# Patient Record
Sex: MALE | Race: OTHER
[De-identification: names, ages, dates, MRNs, and addresses within clinical notes are randomized per-mention and may not be internally consistent; named-entity substitution may affect disease eponyms.]

---

## 2020-09-24 ENCOUNTER — HOSPITAL ENCOUNTER (INPATIENT)
Dept: HOSPITAL 72 - EMR | Age: 60
LOS: 10 days | Discharge: NURSING, REHAB OR LONG-TERM CARE | DRG: 720 | End: 2020-10-04
Payer: COMMERCIAL

## 2020-09-24 VITALS — DIASTOLIC BLOOD PRESSURE: 42 MMHG | SYSTOLIC BLOOD PRESSURE: 78 MMHG

## 2020-09-24 VITALS — SYSTOLIC BLOOD PRESSURE: 92 MMHG | DIASTOLIC BLOOD PRESSURE: 43 MMHG

## 2020-09-24 VITALS — SYSTOLIC BLOOD PRESSURE: 108 MMHG | DIASTOLIC BLOOD PRESSURE: 39 MMHG

## 2020-09-24 VITALS — SYSTOLIC BLOOD PRESSURE: 84 MMHG | DIASTOLIC BLOOD PRESSURE: 47 MMHG

## 2020-09-24 VITALS — SYSTOLIC BLOOD PRESSURE: 100 MMHG | DIASTOLIC BLOOD PRESSURE: 47 MMHG

## 2020-09-24 VITALS — DIASTOLIC BLOOD PRESSURE: 40 MMHG | SYSTOLIC BLOOD PRESSURE: 119 MMHG

## 2020-09-24 VITALS — DIASTOLIC BLOOD PRESSURE: 46 MMHG | SYSTOLIC BLOOD PRESSURE: 92 MMHG

## 2020-09-24 VITALS — DIASTOLIC BLOOD PRESSURE: 36 MMHG | SYSTOLIC BLOOD PRESSURE: 90 MMHG

## 2020-09-24 VITALS — SYSTOLIC BLOOD PRESSURE: 101 MMHG | DIASTOLIC BLOOD PRESSURE: 45 MMHG

## 2020-09-24 VITALS — SYSTOLIC BLOOD PRESSURE: 119 MMHG | DIASTOLIC BLOOD PRESSURE: 60 MMHG

## 2020-09-24 VITALS — SYSTOLIC BLOOD PRESSURE: 91 MMHG | DIASTOLIC BLOOD PRESSURE: 27 MMHG

## 2020-09-24 VITALS — SYSTOLIC BLOOD PRESSURE: 104 MMHG | DIASTOLIC BLOOD PRESSURE: 37 MMHG

## 2020-09-24 VITALS — BODY MASS INDEX: 25.75 KG/M2 | HEIGHT: 70 IN | WEIGHT: 179.9 LBS

## 2020-09-24 DIAGNOSIS — E87.2: ICD-10-CM

## 2020-09-24 DIAGNOSIS — D64.9: ICD-10-CM

## 2020-09-24 DIAGNOSIS — J96.10: ICD-10-CM
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
## 2020-09-24 DIAGNOSIS — B96.20: ICD-10-CM

## 2020-09-24 DIAGNOSIS — Z16.21: ICD-10-CM

## 2020-09-24 DIAGNOSIS — E11.9: ICD-10-CM

## 2020-09-24 DIAGNOSIS — R40.2353: ICD-10-CM

## 2020-09-24 DIAGNOSIS — R40.2143: ICD-10-CM

## 2020-09-24 DIAGNOSIS — I48.0: ICD-10-CM

## 2020-09-24 DIAGNOSIS — Z93.1: ICD-10-CM

## 2020-09-24 DIAGNOSIS — N39.0: ICD-10-CM

## 2020-09-24 DIAGNOSIS — Z98.2: ICD-10-CM

## 2020-09-24 DIAGNOSIS — Z93.0: ICD-10-CM

## 2020-09-24 DIAGNOSIS — R40.3: ICD-10-CM

## 2020-09-24 DIAGNOSIS — I11.0: ICD-10-CM

## 2020-09-24 DIAGNOSIS — R65.21: ICD-10-CM
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
## 2020-09-24 DIAGNOSIS — G93.49: ICD-10-CM

## 2020-09-24 DIAGNOSIS — E87.6: ICD-10-CM

## 2020-09-24 DIAGNOSIS — T81.31XA: ICD-10-CM

## 2020-09-24 DIAGNOSIS — A41.53: Primary | ICD-10-CM

## 2020-09-24 DIAGNOSIS — G40.909: ICD-10-CM

## 2020-09-24 DIAGNOSIS — J90: ICD-10-CM

## 2020-09-24 DIAGNOSIS — Z99.11: ICD-10-CM
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
## 2020-09-24 DIAGNOSIS — I50.9: ICD-10-CM

## 2020-09-24 DIAGNOSIS — Z98.890: ICD-10-CM

## 2020-09-24 DIAGNOSIS — R19.7: ICD-10-CM

## 2020-09-24 DIAGNOSIS — J18.9: ICD-10-CM

## 2020-09-24 DIAGNOSIS — E46: ICD-10-CM

## 2020-09-24 DIAGNOSIS — I69.298: ICD-10-CM

## 2020-09-24 DIAGNOSIS — E83.42: ICD-10-CM

## 2020-09-24 DIAGNOSIS — R47.02: ICD-10-CM

## 2020-09-24 DIAGNOSIS — R40.2213: ICD-10-CM

## 2020-09-24 LAB
ADD MANUAL DIFF: YES
ALBUMIN SERPL-MCNC: 1.6 G/DL (ref 3.4–5)
ALBUMIN/GLOB SERPL: 0.4 {RATIO} (ref 1–2.7)
ALP SERPL-CCNC: 188 U/L (ref 46–116)
ALT SERPL-CCNC: 28 U/L (ref 12–78)
ANION GAP SERPL CALC-SCNC: 10 MMOL/L (ref 5–15)
APPEARANCE UR: (no result)
APTT BLD: 30 SEC (ref 23–33)
APTT PPP: YELLOW S
AST SERPL-CCNC: 45 U/L (ref 15–37)
BILIRUB SERPL-MCNC: 0.7 MG/DL (ref 0.2–1)
BUN SERPL-MCNC: 12 MG/DL (ref 7–18)
CALCIUM SERPL-MCNC: 8.1 MG/DL (ref 8.5–10.1)
CHLORIDE SERPL-SCNC: 96 MMOL/L (ref 98–107)
CK MB SERPL-MCNC: 0.8 NG/ML (ref 0–3.6)
CK SERPL-CCNC: 56 U/L (ref 26–308)
CO2 SERPL-SCNC: 29 MMOL/L (ref 21–32)
CREAT SERPL-MCNC: 0.7 MG/DL (ref 0.55–1.3)
ERYTHROCYTE [DISTWIDTH] IN BLOOD BY AUTOMATED COUNT: 16.9 % (ref 11.6–14.8)
FERRITIN SERPL-MCNC: 352 NG/ML (ref 8–388)
GLOBULIN SER-MCNC: 4.3 G/DL
GLUCOSE UR STRIP-MCNC: NEGATIVE MG/DL
HCT VFR BLD CALC: 24.4 % (ref 42–52)
HGB BLD-MCNC: 7.9 G/DL (ref 14.2–18)
INR PPP: 1.1 (ref 0.9–1.1)
KETONES UR QL STRIP: NEGATIVE
LDH SERPL L TO P-CCNC: 182 U/L (ref 81–234)
LEUKOCYTE ESTERASE UR QL STRIP: (no result)
MCV RBC AUTO: 83 FL (ref 80–99)
NITRITE UR QL STRIP: POSITIVE
PH UR STRIP: 6 [PH] (ref 4.5–8)
PHOSPHATE SERPL-MCNC: 3 MG/DL (ref 2.5–4.9)
PLATELET # BLD: 272 K/UL (ref 150–450)
POTASSIUM SERPL-SCNC: 2.7 MMOL/L (ref 3.5–5.1)
PROT UR QL STRIP: (no result)
RBC # BLD AUTO: 2.94 M/UL (ref 4.7–6.1)
SODIUM SERPL-SCNC: 136 MMOL/L (ref 136–145)
SP GR UR STRIP: 1 (ref 1–1.03)
UROBILINOGEN UR-MCNC: 1 MG/DL (ref 0–1)
WBC # BLD AUTO: 7.7 K/UL (ref 4.8–10.8)

## 2020-09-24 PROCEDURE — 82550 ASSAY OF CK (CPK): CPT

## 2020-09-24 PROCEDURE — 82803 BLOOD GASES ANY COMBINATION: CPT

## 2020-09-24 PROCEDURE — 84443 ASSAY THYROID STIM HORMONE: CPT

## 2020-09-24 PROCEDURE — 87181 SC STD AGAR DILUTION PER AGT: CPT

## 2020-09-24 PROCEDURE — 86920 COMPATIBILITY TEST SPIN: CPT

## 2020-09-24 PROCEDURE — 71045 X-RAY EXAM CHEST 1 VIEW: CPT

## 2020-09-24 PROCEDURE — 84100 ASSAY OF PHOSPHORUS: CPT

## 2020-09-24 PROCEDURE — 87086 URINE CULTURE/COLONY COUNT: CPT

## 2020-09-24 PROCEDURE — 93005 ELECTROCARDIOGRAM TRACING: CPT

## 2020-09-24 PROCEDURE — 85379 FIBRIN DEGRADATION QUANT: CPT

## 2020-09-24 PROCEDURE — 99291 CRITICAL CARE FIRST HOUR: CPT

## 2020-09-24 PROCEDURE — 94002 VENT MGMT INPAT INIT DAY: CPT

## 2020-09-24 PROCEDURE — 76937 US GUIDE VASCULAR ACCESS: CPT

## 2020-09-24 PROCEDURE — 36569 INSJ PICC 5 YR+ W/O IMAGING: CPT

## 2020-09-24 PROCEDURE — 94003 VENT MGMT INPAT SUBQ DAY: CPT

## 2020-09-24 PROCEDURE — 85007 BL SMEAR W/DIFF WBC COUNT: CPT

## 2020-09-24 PROCEDURE — 36415 COLL VENOUS BLD VENIPUNCTURE: CPT

## 2020-09-24 PROCEDURE — 80053 COMPREHEN METABOLIC PANEL: CPT

## 2020-09-24 PROCEDURE — 83690 ASSAY OF LIPASE: CPT

## 2020-09-24 PROCEDURE — 76700 US EXAM ABDOM COMPLETE: CPT

## 2020-09-24 PROCEDURE — 86140 C-REACTIVE PROTEIN: CPT

## 2020-09-24 PROCEDURE — 78266 GSTR EMPT IMG SM BWL&COLON: CPT

## 2020-09-24 PROCEDURE — 83540 ASSAY OF IRON: CPT

## 2020-09-24 PROCEDURE — 87081 CULTURE SCREEN ONLY: CPT

## 2020-09-24 PROCEDURE — 85730 THROMBOPLASTIN TIME PARTIAL: CPT

## 2020-09-24 PROCEDURE — 83550 IRON BINDING TEST: CPT

## 2020-09-24 PROCEDURE — 87040 BLOOD CULTURE FOR BACTERIA: CPT

## 2020-09-24 PROCEDURE — 86900 BLOOD TYPING SEROLOGIC ABO: CPT

## 2020-09-24 PROCEDURE — 82728 ASSAY OF FERRITIN: CPT

## 2020-09-24 PROCEDURE — 82270 OCCULT BLOOD FECES: CPT

## 2020-09-24 PROCEDURE — 80048 BASIC METABOLIC PNL TOTAL CA: CPT

## 2020-09-24 PROCEDURE — 83735 ASSAY OF MAGNESIUM: CPT

## 2020-09-24 PROCEDURE — 81003 URINALYSIS AUTO W/O SCOPE: CPT

## 2020-09-24 PROCEDURE — 87324 CLOSTRIDIUM AG IA: CPT

## 2020-09-24 PROCEDURE — 96368 THER/DIAG CONCURRENT INF: CPT

## 2020-09-24 PROCEDURE — 96367 TX/PROPH/DG ADDL SEQ IV INF: CPT

## 2020-09-24 PROCEDURE — 84484 ASSAY OF TROPONIN QUANT: CPT

## 2020-09-24 PROCEDURE — 83880 ASSAY OF NATRIURETIC PEPTIDE: CPT

## 2020-09-24 PROCEDURE — 85610 PROTHROMBIN TIME: CPT

## 2020-09-24 PROCEDURE — 96365 THER/PROPH/DIAG IV INF INIT: CPT

## 2020-09-24 PROCEDURE — 86850 RBC ANTIBODY SCREEN: CPT

## 2020-09-24 PROCEDURE — 85025 COMPLETE CBC W/AUTO DIFF WBC: CPT

## 2020-09-24 PROCEDURE — 83615 LACTATE (LD) (LDH) ENZYME: CPT

## 2020-09-24 PROCEDURE — 86901 BLOOD TYPING SEROLOGIC RH(D): CPT

## 2020-09-24 PROCEDURE — 82553 CREATINE MB FRACTION: CPT

## 2020-09-24 PROCEDURE — 83605 ASSAY OF LACTIC ACID: CPT

## 2020-09-24 RX ADMIN — CARVEDILOL SCH MG: 25 TABLET, FILM COATED ORAL at 21:00

## 2020-09-24 RX ADMIN — DEXTROSE MONOHYDRATE SCH MLS/HR: 50 INJECTION, SOLUTION INTRAVENOUS at 21:38

## 2020-09-24 RX ADMIN — SODIUM CHLORIDE SCH MLS/HR: 900 INJECTION, SOLUTION INTRAVENOUS at 21:37

## 2020-09-24 RX ADMIN — HEPARIN SODIUM SCH UNITS: 5000 INJECTION INTRAVENOUS; SUBCUTANEOUS at 21:39

## 2020-09-24 RX ADMIN — ACETAMINOPHEN PRN MG: 160 SOLUTION ORAL at 21:39

## 2020-09-24 RX ADMIN — ZINC SULFATE CAP 220 MG (50 MG ELEMENTAL ZN) SCH MG: 220 (50 ZN) CAP at 21:39

## 2020-09-24 RX ADMIN — LEVETIRACETAM SCH MG: 100 SOLUTION ORAL at 21:38

## 2020-09-24 NOTE — EMERGENCY ROOM REPORT
History of Present Illness


General


Chief Complaint:  Dyspnea/Respdistress


Source:  EMS





Present Illness


HPI


Disclaimer: Please note that this report is being documented using DRAGON 

technology. This can lead to erroneous entry secondary to incorrect 

interpretation by the dictating instrument.





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 

bleed, trach and vent dependent, G-tube feed dependent presents for evaluation 

of hypotension and respiratory distress.  From his nursing facility he was found

increasingly tachypneic with low blood pressure systolics in the 90s earlier 

today.  Felt warm but no objective temperature readings were taken.  No further 

information provided.


 


PMH: Reviewed


 


PSH: Trach, G-tube


 


Allergies: None listed in chart


 


Social Hx: Unable to obtain from patient


Allergies:  


Coded Allergies:  


     No Known Allergies (Unverified , 9/24/20)





COVID-19 Screening


Contact w/high risk pt:  No


Experienced COVID-19 symptoms?:  Yes


COVID-19 Testing performed PTA:  No





Nursing Documentation-PMH


Past Medical History:  No History, Except For


Hx COPD:  Yes - ventilator dependant


Hx Diabetes:  Yes


Hx Gastrointestinal Problems:  Yes - dysphasia, GT


Hx Seizures:  Yes





Review of Systems


All Other Systems:  limited - Unable to obtain from patient due to clinical cond

ition





Physical Exam





Vital Signs








  Date Time  Temp Pulse Resp B/P (MAP) Pulse Ox O2 Delivery O2 Flow Rate FiO2


 


9/24/20 16:13  120 30 96/62 (73) 93 Mechanical Ventilator  





 





General: GCS 3, tachycardic, tachypneic


HEENT: NC/AT. EOMI. tracheostomy appears in place without evidence of trauma or 

bleeding.  No subcutaneous air in the neck or face.


Cardiovascular: Tachycardic


Resp: Tachypnea increased work of breathing.  Rhonchorous.  No wheezing.


Abdomen: Abdomen is soft, nondistended.  Nontender


Skin: Warm to the touch.  Intact.  


MSK: No spontaneous movement.  Bilateral picklines present.


Neuro: GCS 3.





Procedures


Critical Care Time


Critical Care Time


Total critical care time: Approximately 45 minutes





Due to a high probability of clinically significant, life threatening 

deterioration, the patient required the highest level of preparedness to 

intervene emergently and I personally spent this critical care time directly and

personally managing the patient. This critical care time included obtaining a 

history, examining the patient, pulse oximetry, ordering and reviewing studies, 

ordering treatments, evaluating response to treatment and updating management 

plan as needed, frequent reassessment and discussion with other providers as 

well as arranging for ultimate disposition.  This critical to care time was 

performed to assess and manage the high probability of life-threatening 

deterioration that could result in multiorgan failure.  This critical care time 

is separate from the separately billable procedures and treating other patients.





Medical Decision Making


Diagnostic Impression:  


   Primary Impression:  


   Rapid atrial fibrillation


   Additional Impressions:  


   Sepsis


   Pneumonia


ER Course


60-year-old male history of intracranial bleed with persistent vegetative state 

who is now trach and vent dependent presents for evaluation of hypotension and 

tachypnea.  Concern for sepsis secondary to a pulmonary source, occult 

bacteremia, cardiac event, COVID-19 among others.  Patient arrived hypotensive 

and started 30 cc/kg bolus and levophed.  EKG shows rapid atrial fibrillation 

with rate in the 20s.  Because of his low blood pressure held off on beta 

blocking medications at this time.  Started on broad-spectrum antibiotics.  

Chest x-ray concerning for left lower lobe pneumonia.  Initial lactate elevated.

 Repeat after fluids.  Patient will be admitted to the ICU. Dr. Dewey is the 

admitting physician per patient's health plan





Sepsis reevaluation:


I, Dr. Arash Campa, reevaluated the patient


Capillary refill: Less than 2 seconds


MAP: 64


Heart rate: 120


Respiratory rate: 27


Initial Lactate: 3.3


Repeat Lactate: 3.3


Pressors: Levophed drip


No signs of fluid overload





Laboratory Tests








Test


 9/24/20


16:15 9/24/20


16:30 9/24/20


16:52 9/24/20


17:15


 


White Blood Count


 7.7 K/UL


(4.8-10.8) 


 


 





 


Red Blood Count


 2.94 M/UL


(4.70-6.10)  L 


 


 





 


Hemoglobin


 7.9 G/DL


(14.2-18.0)  L 


 


 





 


Hematocrit


 24.4 %


(42.0-52.0)  L 


 


 





 


Mean Corpuscular Volume 83 FL (80-99)     


 


Mean Corpuscular Hemoglobin


 26.9 PG


(27.0-31.0)  L 


 


 





 


Mean Corpuscular Hemoglobin


Concent 32.4 G/DL


(32.0-36.0) 


 


 





 


Red Cell Distribution Width


 16.9 %


(11.6-14.8)  H 


 


 





 


Platelet Count


 272 K/UL


(150-450) 


 


 





 


Mean Platelet Volume


 7.5 FL


(6.5-10.1) 


 


 





 


Neutrophils (%) (Auto)


 % (45.0-75.0)


 


 


 





 


Lymphocytes (%) (Auto)


 % (20.0-45.0)


 


 


 





 


Monocytes (%) (Auto)  % (1.0-10.0)     


 


Eosinophils (%) (Auto)  % (0.0-3.0)     


 


Basophils (%) (Auto)  % (0.0-2.0)     


 


Differential Total Cells


Counted 100  


 


 


 





 


Neutrophils % (Manual) 79 % (45-75)  H   


 


Lymphocytes % (Manual) 4 % (20-45)  L   


 


Monocytes % (Manual) 3 % (1-10)     


 


Eosinophils % (Manual) 1 % (0-3)     


 


Basophils % (Manual) 0 % (0-2)     


 


Band Neutrophils 13 % (0-8)  H   


 


Nucleated Red Blood Cells 1 /100 WBC     


 


Platelet Estimate Adequate     


 


Platelet Morphology Normal     


 


Anisocytosis 1+     


 


Prothrombin Time


 12.0 SEC


(9.30-11.50)  H 


 


 





 


Prothrombin Time INR 1.1 (0.9-1.1)     


 


Activated Partial


Thromboplast Time 30 SEC (23-33)


 


 


 





 


D-Dimer


 21.51 mg/L FEU


(0.00-0.49)  H 


 


 





 


Sodium Level


 136 MMOL/L


(136-145) 


 


 





 


Potassium Level


 2.7 MMOL/L


(3.5-5.1)  *L 


 


 





 


Chloride Level


 96 MMOL/L


()  L 


 


 





 


Carbon Dioxide Level


 29 MMOL/L


(21-32) 


 


 





 


Anion Gap


 10 mmol/L


(5-15) 


 


 





 


Blood Urea Nitrogen


 12 mg/dL


(7-18) 


 


 





 


Creatinine


 0.7 MG/DL


(0.55-1.30) 


 


 





 


Estimated Glomerular


Filtration Rate > 60 mL/min


(>60) 


 


 





 


Glucose Level


 99 MG/DL


() 


 


 





 


Lactic Acid Level


 3.30 mmol/L


(0.4-2.0)  H 


 


 3.30 mmol/L


(0.66-2.22)  H


 


Calcium Level


 8.1 MG/DL


(8.5-10.1)  L 


 


 





 


Phosphorus Level


 3.0 MG/DL


(2.5-4.9) 


 


 





 


Magnesium Level


 1.2 MG/DL


(1.8-2.4)  L 


 


 





 


Ferritin


 352 NG/ML


(8-388) 


 


 





 


Total Bilirubin


 0.7 MG/DL


(0.2-1.0) 


 


 





 


Aspartate Amino Transferase


(AST) 45 U/L (15-37)


H 


 


 





 


Alanine Aminotransferase (ALT)


 28 U/L (12-78)


 


 


 





 


Alkaline Phosphatase


 188 U/L


()  H 


 


 





 


Lactate Dehydrogenase


 182 U/L


() 


 


 





 


Total Creatine Kinase


 56 U/L


() 


 


 





 


Creatine Kinase MB


 0.8 NG/ML


(0.0-3.6) 


 


 





 


Creatine Kinase MB Relative


Index 1.4  


 


 


 





 


Troponin I


 0.000 ng/mL


(0.000-0.056) 


 


 





 


C-Reactive Protein,


Quantitative 45.3 mg/dL


(0.00-0.90)  H 


 


 





 


Pro-B-Type Natriuretic Peptide


 242 pg/mL


(0-125)  H 


 


 





 


Total Protein


 5.9 G/DL


(6.4-8.2)  L 


 


 





 


Albumin


 1.6 G/DL


(3.4-5.0)  L 


 


 





 


Globulin 4.3 g/dL     


 


Albumin/Globulin Ratio


 0.4 (1.0-2.7)


L 


 


 





 


Lipase


 1131 U/L


()  H 


 


 





 


Urine Color  Yellow    


 


Urine Appearance  Cloudy    


 


Urine pH  6 (4.5-8.0)    


 


Urine Specific Gravity


 


 1.005


(1.005-1.035) 


 





 


Urine Protein


 


 3+ (NEGATIVE)


H 


 





 


Urine Glucose (UA)


 


 Negative


(NEGATIVE) 


 





 


Urine Ketones


 


 Negative


(NEGATIVE) 


 





 


Urine Blood


 


 4+ (NEGATIVE)


H 


 





 


Urine Nitrite


 


 Positive


(NEGATIVE)  H 


 





 


Urine Bilirubin


 


 Negative


(NEGATIVE) 


 





 


Urine Urobilinogen


 


 1 MG/DL


(0.0-1.0)  H 


 





 


Urine Leukocyte Esterase


 


 1+ (NEGATIVE)


H 


 





 


Urine RBC


 


 30-40 /HPF (0


- 0)  H 


 





 


Urine WBC


 


 10-15 /HPF (0


- 0)  H 


 





 


Urine Squamous Epithelial


Cells 


 Occasional


/LPF 


 





 


Urine Bacteria


 


 Many /HPF


(NONE)  H 


 





 


Arterial Blood pH


 


 


 7.507


(7.350-7.450) 





 


Arterial Blood Partial


Pressure CO2 


 


 35.2 mmHg


(35.0-45.0) 





 


Arterial Blood Partial


Pressure O2 


 


 126.8 mmHg


(75.0-100.0)  H 





 


Arterial Blood HCO3


 


 


 27.3 mmol/L


(22.0-26.0)  H 





 


Arterial Blood Oxygen


Saturation 


 


 98.2 %


() 





 


Arterial Blood Base Excess   4.2 (-2-2)  H 


 


Giovanny Test   Positive   








Microbiology








 Date/Time


Source Procedure


Growth Status





 


 9/24/20 16:15


Nasopharynx SARS-CoV-2 RdRp Gene Assay - Final Complete








EKG Diagnostic Results


EKG Time:  16:12


Rate:  tachycardiac


Other Impression


Atrial fibrillation with rapid ventricular rate in the 120s





Rhythm Strip Diag. Results


Rhythm Strip Time:  16:12


EP Interpretation:  yes


Rate:  120s


Rhythm:  other - Rapid atrial fibrillation





Chest X-Ray Diagnostic Results


Chest X-Ray Diagnostic Results :  


   Chest X-Ray Ordered:  Yes


   Indication:  Shortness of Breath


   EP Interpretation:  Yes


   Interpretation:  other - Tracheostomy in place, left lower lobe consolidation


   Impression:  Other - Left lower lobe pneumonia


   Electronically Signed by:  Electronically signed by Dr. Arash Campa





Last Vital Signs








  Date Time  Temp Pulse Resp B/P (MAP) Pulse Ox O2 Delivery O2 Flow Rate FiO2


 


9/24/20 16:13  120 30 96/62 (73) 93 Mechanical Ventilator  








Disposition:  ADMITTED AS INPATIENT


Condition:  Serious











Arash Campa MD              Sep 24, 2020 16:26

## 2020-09-25 VITALS — SYSTOLIC BLOOD PRESSURE: 134 MMHG | DIASTOLIC BLOOD PRESSURE: 42 MMHG

## 2020-09-25 VITALS — SYSTOLIC BLOOD PRESSURE: 99 MMHG | DIASTOLIC BLOOD PRESSURE: 31 MMHG

## 2020-09-25 VITALS — SYSTOLIC BLOOD PRESSURE: 111 MMHG | DIASTOLIC BLOOD PRESSURE: 30 MMHG

## 2020-09-25 VITALS — DIASTOLIC BLOOD PRESSURE: 24 MMHG | SYSTOLIC BLOOD PRESSURE: 100 MMHG

## 2020-09-25 VITALS — SYSTOLIC BLOOD PRESSURE: 110 MMHG | DIASTOLIC BLOOD PRESSURE: 35 MMHG

## 2020-09-25 VITALS — DIASTOLIC BLOOD PRESSURE: 34 MMHG | SYSTOLIC BLOOD PRESSURE: 116 MMHG

## 2020-09-25 VITALS — DIASTOLIC BLOOD PRESSURE: 35 MMHG | SYSTOLIC BLOOD PRESSURE: 112 MMHG

## 2020-09-25 VITALS — SYSTOLIC BLOOD PRESSURE: 99 MMHG | DIASTOLIC BLOOD PRESSURE: 27 MMHG

## 2020-09-25 VITALS — SYSTOLIC BLOOD PRESSURE: 102 MMHG | DIASTOLIC BLOOD PRESSURE: 41 MMHG

## 2020-09-25 VITALS — SYSTOLIC BLOOD PRESSURE: 102 MMHG | DIASTOLIC BLOOD PRESSURE: 31 MMHG

## 2020-09-25 VITALS — SYSTOLIC BLOOD PRESSURE: 122 MMHG | DIASTOLIC BLOOD PRESSURE: 40 MMHG

## 2020-09-25 VITALS — SYSTOLIC BLOOD PRESSURE: 114 MMHG | DIASTOLIC BLOOD PRESSURE: 24 MMHG

## 2020-09-25 VITALS — SYSTOLIC BLOOD PRESSURE: 104 MMHG | DIASTOLIC BLOOD PRESSURE: 34 MMHG

## 2020-09-25 VITALS — SYSTOLIC BLOOD PRESSURE: 101 MMHG | DIASTOLIC BLOOD PRESSURE: 25 MMHG

## 2020-09-25 VITALS — SYSTOLIC BLOOD PRESSURE: 121 MMHG | DIASTOLIC BLOOD PRESSURE: 28 MMHG

## 2020-09-25 VITALS — DIASTOLIC BLOOD PRESSURE: 42 MMHG | SYSTOLIC BLOOD PRESSURE: 151 MMHG

## 2020-09-25 VITALS — DIASTOLIC BLOOD PRESSURE: 41 MMHG | SYSTOLIC BLOOD PRESSURE: 105 MMHG

## 2020-09-25 VITALS — SYSTOLIC BLOOD PRESSURE: 137 MMHG | DIASTOLIC BLOOD PRESSURE: 47 MMHG

## 2020-09-25 VITALS — DIASTOLIC BLOOD PRESSURE: 19 MMHG | SYSTOLIC BLOOD PRESSURE: 96 MMHG

## 2020-09-25 VITALS — DIASTOLIC BLOOD PRESSURE: 38 MMHG | SYSTOLIC BLOOD PRESSURE: 103 MMHG

## 2020-09-25 VITALS — SYSTOLIC BLOOD PRESSURE: 110 MMHG | DIASTOLIC BLOOD PRESSURE: 42 MMHG

## 2020-09-25 VITALS — SYSTOLIC BLOOD PRESSURE: 114 MMHG | DIASTOLIC BLOOD PRESSURE: 56 MMHG

## 2020-09-25 VITALS — DIASTOLIC BLOOD PRESSURE: 25 MMHG | SYSTOLIC BLOOD PRESSURE: 106 MMHG

## 2020-09-25 VITALS — DIASTOLIC BLOOD PRESSURE: 97 MMHG | SYSTOLIC BLOOD PRESSURE: 124 MMHG

## 2020-09-25 VITALS — DIASTOLIC BLOOD PRESSURE: 21 MMHG | SYSTOLIC BLOOD PRESSURE: 127 MMHG

## 2020-09-25 VITALS — DIASTOLIC BLOOD PRESSURE: 37 MMHG | SYSTOLIC BLOOD PRESSURE: 106 MMHG

## 2020-09-25 VITALS — SYSTOLIC BLOOD PRESSURE: 120 MMHG | DIASTOLIC BLOOD PRESSURE: 26 MMHG

## 2020-09-25 VITALS — SYSTOLIC BLOOD PRESSURE: 100 MMHG | DIASTOLIC BLOOD PRESSURE: 34 MMHG

## 2020-09-25 VITALS — DIASTOLIC BLOOD PRESSURE: 21 MMHG | SYSTOLIC BLOOD PRESSURE: 97 MMHG

## 2020-09-25 VITALS — SYSTOLIC BLOOD PRESSURE: 112 MMHG | DIASTOLIC BLOOD PRESSURE: 31 MMHG

## 2020-09-25 VITALS — SYSTOLIC BLOOD PRESSURE: 130 MMHG | DIASTOLIC BLOOD PRESSURE: 42 MMHG

## 2020-09-25 VITALS — SYSTOLIC BLOOD PRESSURE: 97 MMHG | DIASTOLIC BLOOD PRESSURE: 32 MMHG

## 2020-09-25 VITALS — DIASTOLIC BLOOD PRESSURE: 42 MMHG | SYSTOLIC BLOOD PRESSURE: 84 MMHG

## 2020-09-25 VITALS — DIASTOLIC BLOOD PRESSURE: 52 MMHG | SYSTOLIC BLOOD PRESSURE: 131 MMHG

## 2020-09-25 VITALS — DIASTOLIC BLOOD PRESSURE: 32 MMHG | SYSTOLIC BLOOD PRESSURE: 104 MMHG

## 2020-09-25 VITALS — DIASTOLIC BLOOD PRESSURE: 29 MMHG | SYSTOLIC BLOOD PRESSURE: 114 MMHG

## 2020-09-25 VITALS — DIASTOLIC BLOOD PRESSURE: 33 MMHG | SYSTOLIC BLOOD PRESSURE: 145 MMHG

## 2020-09-25 VITALS — SYSTOLIC BLOOD PRESSURE: 118 MMHG | DIASTOLIC BLOOD PRESSURE: 28 MMHG

## 2020-09-25 VITALS — SYSTOLIC BLOOD PRESSURE: 116 MMHG | DIASTOLIC BLOOD PRESSURE: 39 MMHG

## 2020-09-25 VITALS — SYSTOLIC BLOOD PRESSURE: 109 MMHG | DIASTOLIC BLOOD PRESSURE: 27 MMHG

## 2020-09-25 VITALS — SYSTOLIC BLOOD PRESSURE: 117 MMHG | DIASTOLIC BLOOD PRESSURE: 24 MMHG

## 2020-09-25 VITALS — SYSTOLIC BLOOD PRESSURE: 103 MMHG | DIASTOLIC BLOOD PRESSURE: 34 MMHG

## 2020-09-25 VITALS — DIASTOLIC BLOOD PRESSURE: 43 MMHG | SYSTOLIC BLOOD PRESSURE: 106 MMHG

## 2020-09-25 VITALS — DIASTOLIC BLOOD PRESSURE: 37 MMHG | SYSTOLIC BLOOD PRESSURE: 113 MMHG

## 2020-09-25 VITALS — DIASTOLIC BLOOD PRESSURE: 57 MMHG | SYSTOLIC BLOOD PRESSURE: 98 MMHG

## 2020-09-25 VITALS — SYSTOLIC BLOOD PRESSURE: 127 MMHG | DIASTOLIC BLOOD PRESSURE: 36 MMHG

## 2020-09-25 VITALS — SYSTOLIC BLOOD PRESSURE: 104 MMHG | DIASTOLIC BLOOD PRESSURE: 28 MMHG

## 2020-09-25 VITALS — SYSTOLIC BLOOD PRESSURE: 130 MMHG | DIASTOLIC BLOOD PRESSURE: 35 MMHG

## 2020-09-25 VITALS — SYSTOLIC BLOOD PRESSURE: 114 MMHG | DIASTOLIC BLOOD PRESSURE: 30 MMHG

## 2020-09-25 VITALS — DIASTOLIC BLOOD PRESSURE: 40 MMHG | SYSTOLIC BLOOD PRESSURE: 102 MMHG

## 2020-09-25 VITALS — DIASTOLIC BLOOD PRESSURE: 34 MMHG | SYSTOLIC BLOOD PRESSURE: 130 MMHG

## 2020-09-25 VITALS — DIASTOLIC BLOOD PRESSURE: 42 MMHG | SYSTOLIC BLOOD PRESSURE: 98 MMHG

## 2020-09-25 VITALS — SYSTOLIC BLOOD PRESSURE: 105 MMHG | DIASTOLIC BLOOD PRESSURE: 41 MMHG

## 2020-09-25 VITALS — DIASTOLIC BLOOD PRESSURE: 39 MMHG | SYSTOLIC BLOOD PRESSURE: 134 MMHG

## 2020-09-25 VITALS — DIASTOLIC BLOOD PRESSURE: 55 MMHG | SYSTOLIC BLOOD PRESSURE: 119 MMHG

## 2020-09-25 VITALS — DIASTOLIC BLOOD PRESSURE: 25 MMHG | SYSTOLIC BLOOD PRESSURE: 94 MMHG

## 2020-09-25 VITALS — DIASTOLIC BLOOD PRESSURE: 33 MMHG | SYSTOLIC BLOOD PRESSURE: 113 MMHG

## 2020-09-25 VITALS — DIASTOLIC BLOOD PRESSURE: 42 MMHG | SYSTOLIC BLOOD PRESSURE: 93 MMHG

## 2020-09-25 VITALS — SYSTOLIC BLOOD PRESSURE: 125 MMHG | DIASTOLIC BLOOD PRESSURE: 31 MMHG

## 2020-09-25 VITALS — DIASTOLIC BLOOD PRESSURE: 53 MMHG | SYSTOLIC BLOOD PRESSURE: 124 MMHG

## 2020-09-25 VITALS — SYSTOLIC BLOOD PRESSURE: 117 MMHG | DIASTOLIC BLOOD PRESSURE: 34 MMHG

## 2020-09-25 VITALS — DIASTOLIC BLOOD PRESSURE: 36 MMHG | SYSTOLIC BLOOD PRESSURE: 86 MMHG

## 2020-09-25 VITALS — DIASTOLIC BLOOD PRESSURE: 34 MMHG | SYSTOLIC BLOOD PRESSURE: 123 MMHG

## 2020-09-25 VITALS — DIASTOLIC BLOOD PRESSURE: 40 MMHG | SYSTOLIC BLOOD PRESSURE: 142 MMHG

## 2020-09-25 VITALS — DIASTOLIC BLOOD PRESSURE: 32 MMHG | SYSTOLIC BLOOD PRESSURE: 118 MMHG

## 2020-09-25 VITALS — SYSTOLIC BLOOD PRESSURE: 115 MMHG | DIASTOLIC BLOOD PRESSURE: 31 MMHG

## 2020-09-25 VITALS — DIASTOLIC BLOOD PRESSURE: 45 MMHG | SYSTOLIC BLOOD PRESSURE: 106 MMHG

## 2020-09-25 VITALS — SYSTOLIC BLOOD PRESSURE: 98 MMHG | DIASTOLIC BLOOD PRESSURE: 30 MMHG

## 2020-09-25 VITALS — SYSTOLIC BLOOD PRESSURE: 126 MMHG | DIASTOLIC BLOOD PRESSURE: 34 MMHG

## 2020-09-25 VITALS — DIASTOLIC BLOOD PRESSURE: 46 MMHG | SYSTOLIC BLOOD PRESSURE: 96 MMHG

## 2020-09-25 LAB
ADD MANUAL DIFF: YES
ALBUMIN SERPL-MCNC: 1.5 G/DL (ref 3.4–5)
ALBUMIN/GLOB SERPL: 0.3 {RATIO} (ref 1–2.7)
ALP SERPL-CCNC: 144 U/L (ref 46–116)
ALT SERPL-CCNC: 26 U/L (ref 12–78)
ANION GAP SERPL CALC-SCNC: 8 MMOL/L (ref 5–15)
AST SERPL-CCNC: 45 U/L (ref 15–37)
BILIRUB SERPL-MCNC: 0.8 MG/DL (ref 0.2–1)
BUN SERPL-MCNC: 12 MG/DL (ref 7–18)
CALCIUM SERPL-MCNC: 7.6 MG/DL (ref 8.5–10.1)
CHLORIDE SERPL-SCNC: 97 MMOL/L (ref 98–107)
CO2 SERPL-SCNC: 26 MMOL/L (ref 21–32)
CREAT SERPL-MCNC: 0.6 MG/DL (ref 0.55–1.3)
ERYTHROCYTE [DISTWIDTH] IN BLOOD BY AUTOMATED COUNT: 16.2 % (ref 11.6–14.8)
GLOBULIN SER-MCNC: 4.3 G/DL
HCT VFR BLD CALC: 21.5 % (ref 42–52)
HGB BLD-MCNC: 7.3 G/DL (ref 14.2–18)
MCV RBC AUTO: 80 FL (ref 80–99)
PLATELET # BLD: 246 K/UL (ref 150–450)
POTASSIUM SERPL-SCNC: 3.3 MMOL/L (ref 3.5–5.1)
RBC # BLD AUTO: 2.7 M/UL (ref 4.7–6.1)
SODIUM SERPL-SCNC: 131 MMOL/L (ref 136–145)
WBC # BLD AUTO: 17.8 K/UL (ref 4.8–10.8)

## 2020-09-25 RX ADMIN — CARVEDILOL SCH MG: 25 TABLET, FILM COATED ORAL at 09:43

## 2020-09-25 RX ADMIN — SODIUM CHLORIDE SCH MLS/HR: 900 INJECTION, SOLUTION INTRAVENOUS at 12:38

## 2020-09-25 RX ADMIN — LEVETIRACETAM SCH MG: 100 SOLUTION ORAL at 21:03

## 2020-09-25 RX ADMIN — MEROPENEM SCH MLS/HR: 500 INJECTION INTRAVENOUS at 21:04

## 2020-09-25 RX ADMIN — MEROPENEM SCH MLS/HR: 500 INJECTION INTRAVENOUS at 14:37

## 2020-09-25 RX ADMIN — Medication SCH MG: at 17:13

## 2020-09-25 RX ADMIN — CARVEDILOL SCH MG: 25 TABLET, FILM COATED ORAL at 21:00

## 2020-09-25 RX ADMIN — SODIUM CHLORIDE SCH MLS/HR: 900 INJECTION, SOLUTION INTRAVENOUS at 02:14

## 2020-09-25 RX ADMIN — ZINC SULFATE CAP 220 MG (50 MG ELEMENTAL ZN) SCH MG: 220 (50 ZN) CAP at 09:43

## 2020-09-25 RX ADMIN — LEVETIRACETAM SCH MG: 100 SOLUTION ORAL at 09:43

## 2020-09-25 RX ADMIN — Medication SCH MG: at 09:42

## 2020-09-25 RX ADMIN — HEPARIN SODIUM SCH UNITS: 5000 INJECTION INTRAVENOUS; SUBCUTANEOUS at 09:00

## 2020-09-25 RX ADMIN — ACETAMINOPHEN PRN MG: 160 SOLUTION ORAL at 04:31

## 2020-09-25 RX ADMIN — ACETAMINOPHEN PRN MG: 160 SOLUTION ORAL at 17:13

## 2020-09-25 RX ADMIN — DEXTROSE MONOHYDRATE SCH MLS/HR: 50 INJECTION, SOLUTION INTRAVENOUS at 06:43

## 2020-09-25 RX ADMIN — CHLORHEXIDINE GLUCONATE SCH APPLIC: 213 SOLUTION TOPICAL at 20:15

## 2020-09-25 RX ADMIN — HEPARIN SODIUM SCH UNITS: 5000 INJECTION INTRAVENOUS; SUBCUTANEOUS at 21:00

## 2020-09-25 RX ADMIN — SODIUM CHLORIDE SCH MLS/HR: 900 INJECTION, SOLUTION INTRAVENOUS at 19:29

## 2020-09-25 RX ADMIN — SODIUM CHLORIDE SCH MLS/HR: 900 INJECTION, SOLUTION INTRAVENOUS at 06:44

## 2020-09-25 NOTE — DIAGNOSTIC IMAGING REPORT
Indication: Abnormal liver function tests, anemia

 

Technique: Gray-scale and duplex images of the upper abdomen were obtained

 

Comparison: none

 

Findings: Gallbladder demonstrates sludge and gallstones. Gallbladder wall is

thickened and edematous, measuring up to 6 mm thick. For gallbladder is not

distended, however. Some comet tail artifact is seen in the gallbladder wall as well.

 Sonographic Dominguez sign could not be reported, patient uncommunicative.  Common bile

duct measures 2 mm in diameter.  No intrahepatic biliary ductal dilatation.  Liver

demonstrates normal echogenicity, no focal abnormality. It is somewhat enlarged.

Portal vein and hepatic veins are patent.   The pancreas is unremarkable. However,

adjacent to the pancreatic body, there is a 2.6 cm somewhat hypoechoic mass.   

Spleen is unremarkable.  Left kidney measures 11.9 cm in length.  Right kidney

measures 12.4 cm length.  Both kidneys demonstrate normal echogenicity.  There is no

hydronephrosis.   No focal abnormality . Abdominal aorta is partially obscured by

bowel gas, visualized portions are non-aneurysmal .  There is a small right pleural

effusion

 

Impression: Hepatomegaly

 

Gallbladder sludge and stones. Gallbladder wall thickening and edema, raises

possibility of acute cholecystitis. This could also be edema due to hemodynamic

causes or adjacent hepatic inflammation, however. Consider hepatobiliary nuclear scan

for further evaluation

 

Comet tail artifact within the gallbladder wall, consistent with adenomyomatosis

 

2.6 cm mass adjacent to the pancreatic body. Could represent lymphadenopathy.

Consider further evaluation with CT scan

 

Small right pleural effusion

## 2020-09-25 NOTE — BRIEF OPERATIVE NOTE
Immediate Post Operative Note


Operative Note


Pre-op Diagnosis:


needs long term IV access


Procedure:


PICC


Post-op Diagnosis:  same as pre-op


Surgeon:  IVA Winter


Anesthesia:  local


Specimen:  none


Complications:  none


Fluids:  none


Implant(s) used?:  No











See Winter MD                Sep 25, 2020 19:02

## 2020-09-25 NOTE — DIAGNOSTIC IMAGING REPORT
Procedure: XRAY Chest 1v

Reason for study: Reason For Exam: SOB

 

Comparison films:  None.

 

FINDINGS:

 

There is a tracheostomy in place. Right-sided  shunt also noted. Vascularity is

normal. There are patchy bilateral infiltrates. Mild cardiomegaly and bilateral small

effusions noted. The bony thorax appear unremarkable.

 

IMPRESSION:  

 

Bilateral patchy infiltrates with bilateral small effusions.

## 2020-09-25 NOTE — HISTORY AND PHYSICAL REPORT
DATE OF ADMISSION:  09/24/2020

CHIEF COMPLAINT:  Septic shock.



HISTORY OF PRESENT ILLNESS:  The patient is an unfortunate 60-year-old

male.  He apparently suffered an intracranial bleed several weeks ago.  He

underwent a craniotomy and EVD, and later a  shunt.  His hospital course

was complicated by seizures and chronic respiratory failure.  He underwent

a placement of a trach and a surgical G-tube.  He was transferred to a

skilled nursing facility where he developed fevers and shortness of

breath, was transferred to the emergency room.  On evaluation there, he

was tachypneic, febrile, and hypotensive.  He received IV fluids, but

eventually required placement on pressors for blood pressure support.  The

patient has been being pancultured and started on broad spectrum IV

antibiotics, now admitted for further evaluation and care.



PAST MEDICAL HISTORY:  As above.



PAST SURGICAL HISTORY:  As above.



CURRENT MEDICATIONS:  Reconciled and reviewed.



ALLERGIES:  None.



FAMILY HISTORY:  None.



SOCIAL HISTORY:  There is no known history of tobacco or drugs.  The

patient has a history of alcohol use.



REVIEW OF SYSTEMS:  Unobtainable as the patient is nonverbal.



PHYSICAL EXAMINATION:

VITAL SIGNS:  Temperature 100.3, pulse 107, respirations 22, blood pressure

114/24.

GENERAL:  The patient is a thin, chronically ill-appearing male, in no

apparent distress.  The pupils are equal, round, and reactive to light.

Oropharynx is clear.

NECK:  Supple.  Trach site was clean and midline.

HEART:  Regular rate and rhythm.

LUNGS:  Clear.

ABDOMEN:  Soft, nontender, nondistended.  There is surgical scar on the

right side of the abdomen with staples.

EXTREMITIES:  Without clubbing or cyanosis.  There is 1+ edema

noted.



LABORATORY DATA:  UA showed 10 to 15 wbc's.  White count was 8, hemoglobin

8, platelet count 272,000.  Sodium 136, potassium 2.7, chloride 96, bicarb

of 29, BUN of 12, creatinine 0.7.  C-reactive protein was 45.  Lipase was

1100.



Chest x-ray showed a possible left lower lobe infiltrate.



ASSESSMENT:  This is an unfortunate 60-year-old male with a history of

intracranial bleed, status post craniotomy,  shunt.  He has a history of

seizures and chronic respiratory failure.



PLAN:

1. We will discontinue the patient's old PICC line.

2. Broad spectrum IV antibiotics.

3. Continue pressors.

4. Aggressive fluid resuscitation.

5. Continue vent support.

6. ID and Pulmonary consultations will be obtained.

7. Plan of care has been discussed with the patient's sisters.  They

currently want him a Full Code.  They are aware that his status is

currently critical and guarded.









  ______________________________________________

  Brijesh Dewey M.D.





DR:  AQUILES

D:  09/25/2020 08:08

T:  09/25/2020 08:39

JOB#:  [id number]

CC:

## 2020-09-25 NOTE — DIAGNOSTIC IMAGING REPORT
Indications: Needs long-term IV access

 

Technique: Procedure performed at bedside. Procedural timeout performed. Ultrasound

confirms patent compressible right basilic vein. Total sterile technique, including

sterile probe cover and sterile gel, sterile gloves, hand hygiene, hat, mask,,

sterile gown, large sterile drape, and preparation with 2% chlorhexidine utilized.

Local anesthesia with 1% lidocaine. Under real-time ultrasound guidance, puncture

basilic vein using 21-gauge needle, passage 0.018 guidewire, exchange for 4 Icelandic

peel-away sheath. 4 Icelandic Bard dual-lumen power PICC cut to 45 cm. It was inserted

through the peel-away sheath. Peel-away sheath and guidewire removed. Catheter fixed

to the skin. Both catheter ports aspirated and flushed. Patient tolerated procedure

well, without immediate complication. Followup chest x-ray obtained, documents

catheter tip position at the cavoatrial junction

 

Impression: Successful bedside placement of right arm PICC under sonographic

guidance, as described above.

## 2020-09-25 NOTE — CARDIOLOGY PROGRESS NOTE
Subjective


DATE OF SERVICE:  Sep 25, 1930


Patient remains in ICU


On pressor support for hypotension.


Low hemoglobin, K+, and Mg++ noted today.





Objective





Last 24 Hour Vital Signs








  Date Time  Temp Pulse Resp B/P (MAP) Pulse Ox O2 Delivery O2 Flow Rate FiO2


 


9/25/20 23:08  106 27     40


 


9/25/20 22:45  100 23 118/32 (60) 100   


 


9/25/20 22:30  99 24 112/35 (60) 100   


 


9/25/20 22:15  99 20 114/29 (57) 100   


 


9/25/20 22:00    113/32    


 


9/25/20 22:00  96 21 113/33 (59) 100   


 


9/25/20 21:45 98.7 96 19 106/37 (60) 100   


 


9/25/20 21:30 98.8 95 21 110/42 (64) 100   


 


9/25/20 21:15  95 21 106/37 (60) 100   


 


9/25/20 21:00  92 22 102/41 (61) 100   


 


9/25/20 21:00    102/41    


 


9/25/20 20:45  92 21 103/34 (57) 100   


 


9/25/20 20:30  93 24 102/40 (60) 100   


 


9/25/20 20:15  91 21 98/30 (52) 100   


 


9/25/20 20:00 98.8 96 22 98/42 (60) 100   


 


9/25/20 20:00      Mechanical Ventilator  


 


9/25/20 20:00    98/42    


 


9/25/20 20:00        40


 


9/25/20 20:00  95      


 


9/25/20 19:30  97 24 96/46 (63) 100   


 


9/25/20 19:29    90/41    


 


9/25/20 19:15  93 20 86/36 (53) 100   


 


9/25/20 19:03  99 25     40


 


9/25/20 19:00    105/41    


 


9/25/20 19:00  96 20 105/41 (62) 100   


 


9/25/20 18:48 99.5       


 


9/25/20 18:45  111 29 119/55 (76) 100   


 


9/25/20 18:30  105 27 105/41 (62) 100   


 


9/25/20 18:15  106 24 106/43 (64) 100   


 


9/25/20 18:00    98/57    


 


9/25/20 18:00  107 24 98/57 (71) 100   


 


9/25/20 17:45  109 23 93/42 (59) 100   


 


9/25/20 17:30  116 27 103/38 (59) 100   


 


9/25/20 17:15  110 21 99/31 (53) 100   


 


9/25/20 17:00    102/31    


 


9/25/20 17:00  110 23 102/31 (54) 100   


 


9/25/20 16:45  107 26 99/27 (51) 100   


 


9/25/20 16:30  109 24 97/32 (53) 100   


 


9/25/20 16:15 102.1 113 23 110/35 (60) 100   


 


9/25/20 16:00  107      


 


9/25/20 16:00  107 25 104/34 (57) 100   


 


9/25/20 16:00    104/34    


 


9/25/20 16:00      Mechanical Ventilator  


 


9/25/20 15:57        40


 


9/25/20 15:45  106 23 104/28 (53) 100   


 


9/25/20 15:30  106 25 112/31 (58) 100   


 


9/25/20 15:15  108 19 104/32 (56) 100   


 


9/25/20 15:10  105 24     40


 


9/25/20 15:00  104 21 97/21 (46) 100   


 


9/25/20 15:00    97/21    


 


9/25/20 14:45  104 24 94/25 (48) 100   


 


9/25/20 14:30  105 24 96/19 (44) 100   


 


9/25/20 14:15  108 25 100/24 (49) 100   


 


9/25/20 14:00  110 23 109/27 (54) 100   


 


9/25/20 14:00    109/27    


 


9/25/20 13:45  112 23 116/34 (61) 100   


 


9/25/20 13:30  113 28 118/28 (58) 100   


 


9/25/20 13:15  115 27 117/24 (55) 100   


 


9/25/20 13:00    113/37    


 


9/25/20 13:00  118 27 113/37 (62) 100   


 


9/25/20 12:45  120 37 127/21 (56) 100   


 


9/25/20 12:38    106/25    


 


9/25/20 12:30  120 32 106/25 (52) 100   


 


9/25/20 12:15  117 31 126/34 (64) 100   


 


9/25/20 12:00  119      


 


9/25/20 12:00 99.2 117 35 137/47 (77) 100   


 


9/25/20 12:00        60


 


9/25/20 12:00      Mechanical Ventilator  


 


9/25/20 12:00    137/47    


 


9/25/20 11:45  120 35 116/39 (64) 100   


 


9/25/20 11:30  114 29 134/42 (72)    


 


9/25/20 11:15  110 29 123/34 (63) 100   


 


9/25/20 11:05  117 32     60


 


9/25/20 11:00    130/35    


 


9/25/20 11:00  109 24 130/35 (66) 100   


 


9/25/20 10:45  113 28 124/53 (76) 100   


 


9/25/20 10:30  112 36 134/39 (70) 100   


 


9/25/20 10:15  126 38 151/42 (78) 100   


 


9/25/20 10:00    114/56    


 


9/25/20 10:00  118 36 114/56 (75) 100   


 


9/25/20 09:45  108 36 142/40 (74) 100   


 


9/25/20 09:43  101  130/34    


 


9/25/20 09:30  100 30 130/34 (66) 100   


 


9/25/20 09:15  99 23 124/97 (106) 100   


 


9/25/20 09:00    122/40    


 


9/25/20 09:00  92 23 122/40 (67) 100   


 


9/25/20 08:45  95 24 127/36 (66) 100   


 


9/25/20 08:30    130/42    


 


9/25/20 08:30  87 21 130/42 (71) 100   


 


9/25/20 08:00        60


 


9/25/20 08:00 98.9 88 21 114/30 (58) 100   


 


9/25/20 08:00      Mechanical Ventilator  


 


9/25/20 08:00    114/30    


 


9/25/20 08:00  87      


 


9/25/20 07:30  88 21 120/26 (57) 100   


 


9/25/20 07:10  94 21     60


 


9/25/20 07:00    114/24    


 


9/25/20 07:00  90 22 114/24 (54) 100   


 


9/25/20 06:44    111/36    


 


9/25/20 06:00  107 48 131/52 (78) 100   


 


9/25/20 06:00    131/52    


 


9/25/20 05:01 100.3       


 


9/25/20 05:00  100 24 111/30 (57) 100   


 


9/25/20 05:00    112/35    


 


9/25/20 04:00    115/31    


 


9/25/20 04:00        80


 


9/25/20 04:00 100.5 102 34 115/31 (59) 100   


 


9/25/20 04:00      Mechanical Ventilator  


 


9/25/20 03:31  100      


 


9/25/20 03:22  105 20     80


 


9/25/20 03:00    106/45    


 


9/25/20 03:00  97 20 106/45 (65) 100   


 


9/25/20 02:14    100/34    


 


9/25/20 02:00  97 20 100/34 (56) 100   


 


9/25/20 01:00  101 20 101/25 (50) 100   


 


9/25/20 00:00      Mechanical Ventilator  


 


9/25/20 00:00    103/31    


 


9/25/20 00:00 100.3 112 25 84/42 (56) 100   








ROS: unchanged from my note of 9/24/20.


HEENT:  Thin Trach secretions


RHYTHM:  Afib


LUNGS:  bilateral rhonchi


CARDIAC:  irregularly irregular


ABDOMEN:  non tender, soft, distended, G-Tube intact


EXTREMITIES:  trace edema, other - Neuro: comatose state





Laboratory Tests








Test


 9/25/20


05:00 9/25/20


10:00


 


Lactic Acid Level


 1.80 mmol/L


(0.4-2.0) 





 


White Blood Count


 


 17.8 K/UL


(4.8-10.8)  #H


 


Red Blood Count


 


 2.70 M/UL


(4.70-6.10)  L


 


Hemoglobin


 


 7.3 G/DL


(14.2-18.0)  L


 


Hematocrit


 


 21.5 %


(42.0-52.0)  L


 


Mean Corpuscular Volume  80 FL (80-99)  


 


Mean Corpuscular Hemoglobin


 


 26.9 PG


(27.0-31.0)  L


 


Mean Corpuscular Hemoglobin


Concent 


 33.7 G/DL


(32.0-36.0)


 


Red Cell Distribution Width


 


 16.2 %


(11.6-14.8)  H


 


Platelet Count


 


 246 K/UL


(150-450)


 


Mean Platelet Volume


 


 7.1 FL


(6.5-10.1)


 


Neutrophils (%) (Auto)


 


 % (45.0-75.0)





 


Lymphocytes (%) (Auto)


 


 % (20.0-45.0)





 


Monocytes (%) (Auto)   % (1.0-10.0)  


 


Eosinophils (%) (Auto)   % (0.0-3.0)  


 


Basophils (%) (Auto)   % (0.0-2.0)  


 


Differential Total Cells


Counted 


 100  





 


Neutrophils % (Manual)  82 % (45-75)  H


 


Lymphocytes % (Manual)  14 % (20-45)  L


 


Monocytes % (Manual)  3 % (1-10)  


 


Eosinophils % (Manual)  0 % (0-3)  


 


Basophils % (Manual)  0 % (0-2)  


 


Band Neutrophils  1 % (0-8)  


 


Platelet Estimate  Adequate  


 


Platelet Morphology  Normal  


 


Hypochromasia  3+  


 


Anisocytosis  1+  


 


Sodium Level


 


 131 MMOL/L


(136-145)  L


 


Potassium Level


 


 3.3 MMOL/L


(3.5-5.1)  L


 


Chloride Level


 


 97 MMOL/L


()  L


 


Carbon Dioxide Level


 


 26 MMOL/L


(21-32)


 


Anion Gap


 


 8 mmol/L


(5-15)


 


Blood Urea Nitrogen


 


 12 mg/dL


(7-18)


 


Creatinine


 


 0.6 MG/DL


(0.55-1.30)


 


Estimat Glomerular Filtration


Rate 


 > 60 mL/min


(>60)


 


Glucose Level


 


 143 MG/DL


()  H


 


Calcium Level


 


 7.6 MG/DL


(8.5-10.1)  L


 


Total Bilirubin


 


 0.8 MG/DL


(0.2-1.0)


 


Aspartate Amino Transf


(AST/SGOT) 


 45 U/L (15-37)


H


 


Alanine Aminotransferase


(ALT/SGPT) 


 26 U/L (12-78)





 


Alkaline Phosphatase


 


 144 U/L


()  H


 


Total Protein


 


 5.8 G/DL


(6.4-8.2)  L


 


Albumin


 


 1.5 G/DL


(3.4-5.0)  L


 


Globulin  4.3 g/dL  


 


Albumin/Globulin Ratio


 


 0.3 (1.0-2.7)


L











Microbiology








 Date/Time


Source Procedure


Growth Status





 


 9/25/20 06:00


Rectum  Received


 


 9/24/20 16:30


Urine,Clean Catch Urine Culture - Preliminary


Gram Negative [place] Resulted


 


 9/24/20 16:30


Blood Blood Culture - Preliminary Resulted


 


 9/24/20 16:15


Nasopharynx SARS-CoV-2 RdRp Gene Assay - Final Complete


 


 9/24/20 16:15


Blood Blood Culture - Preliminary Resulted











Assessment/Plan


Assessment/Plan


Sepsis with shock


Respiratory failure


Chronic vegetative state


Anemia


Hypokalemia


Hypomagnesemia


Lactic acidosis


PAFib with RVR


CRITICAL & GUARDED











Abx


 Resp support


IVF hydration


Taper off pressors


Hold carvedilol and amlodipine until off pressors


PRBC transfusion


IV Mg++ and K+











Jamie Jo MD                Sep 25, 2020 23:40

## 2020-09-25 NOTE — CONSULTATION
DATE OF CONSULTATION:  09/25/2020

INFECTIOUS DISEASE CONSULTATION



CONSULTING PHYSICIAN:  Frank Bernardo MD.



REFERRING PHYSICIAN:  Brijesh Dewey MD.



REASON FOR CONSULTATION:  Sepsis.



HISTORY OF PRESENTING ILLNESS:  This is a 60-year-old gentleman with

history of intracranial bleed, status post craniotomy with external

ventricular drainage, which was changed to a  shunt, as well as

respiratory failure, status post tracheostomy and G-tube placement, who

comes in with fevers.  He was found to be hypotensive and septic and an

Infectious Diseases consultation has been obtained for antibiotics.



PAST MEDICAL HISTORY:

1. History of intracranial bleed.

2. Status post craniotomy and  shunt placement.

3. Seizures.

4. Respiratory failure, status post tracheostomy.

5. Status post G-tube placement.



SOCIAL HISTORY:  No history of smoking.  He has a history of alcohol use.

No history of drug use.



FAMILY HISTORY:  Unknown.



REVIEW OF SYSTEMS:  Unable to obtain currently.



MEDICATIONS:  As an inpatient, he is on chlorhexidine gluconate, ascorbic

acid, amlodipine, lidocaine, heparin, vancomycin, norepinephrine, Zosyn,

subcutaneous heparin, zinc sulfate, Keppra, Coreg, Tylenol, famotidine,

and Dulcolax.



ALLERGIES:  No known drug allergies.



PHYSICAL EXAMINATION:

VITAL SIGNS:  Temperature 98.9, T-max of 101.7, pulse 118, respiratory rate

36, blood pressure 114/56.  O2 saturation of 100%.

HEENT:  Pupils are equally reactive to light and accommodation.  Mouth

appears clean without thrush.

NECK:  Supple.  No adenopathy.  No JVD.  Tracheostomy site is clean

CARDIOVASCULAR:  Regular rate and rhythm.  No murmurs.

LUNGS:  Clear to auscultation bilaterally.  No crackles.  No

wheezes.

ABDOMEN:  Soft, nontender.  G-tube site appears clean.

EXTREMITIES:  No cyanosis, no clubbing, no edema.  Left arm catheter

noted.



LABORATORY AND DIAGNOSTIC DATA:  White count 17.8, hemoglobin 7.3,

hematocrit 21.5, MCV 80, platelet count of 246,000.  Sodium 131, potassium

3.3, chloride 97, bicarb 26, BUN 12, creatinine 0.6.  Glucose 143.

Calcium 7.6.  Total bilirubin 0.8, AST 45, ALT 26, alkaline phosphatase

144.  Total protein 5.8, albumin 1.5.  Lipase of 1131.  UA showing 10 to

15 white cells.  Urine culture showing gram-negative rods.  Blood cultures

growing gram-negative rods.  COVID-19 test is negative.



ASSESSMENT:  This is a 60-year-old gentleman with history of intracranial

bleed, status post external ventricular shunt and subsequently  shunt

placement with seizures, who comes in with hypotension and fevers and is

found to have,



1. Gram-negative sepsis, could be secondary to urinary tract infection

or a catheter infection.

2. Gram-negative urinary tract infection.

3. Seizures.

4. Respiratory failure, status post tracheostomy.

5. Increasing leukocytosis.

6. Septic shock.



PLAN:

1. Discontinue vancomycin and Zosyn.  We will start the patient on

meropenem.

2. Left arm catheter removal has been planned.

3. We will follow up cultures and adjust antibiotics accordingly.



I would like to thank Dr. Dewey for this consultation.









  ______________________________________________

  Frank Bernardo M.D. DR:  JIM

D:  09/25/2020 11:47

T:  09/25/2020 12:15

JOB#:  [id number]

CC:

## 2020-09-26 VITALS — DIASTOLIC BLOOD PRESSURE: 34 MMHG | SYSTOLIC BLOOD PRESSURE: 106 MMHG

## 2020-09-26 VITALS — SYSTOLIC BLOOD PRESSURE: 113 MMHG | DIASTOLIC BLOOD PRESSURE: 28 MMHG

## 2020-09-26 VITALS — DIASTOLIC BLOOD PRESSURE: 38 MMHG | SYSTOLIC BLOOD PRESSURE: 115 MMHG

## 2020-09-26 VITALS — SYSTOLIC BLOOD PRESSURE: 112 MMHG | DIASTOLIC BLOOD PRESSURE: 36 MMHG

## 2020-09-26 VITALS — SYSTOLIC BLOOD PRESSURE: 123 MMHG | DIASTOLIC BLOOD PRESSURE: 36 MMHG

## 2020-09-26 VITALS — SYSTOLIC BLOOD PRESSURE: 115 MMHG | DIASTOLIC BLOOD PRESSURE: 30 MMHG

## 2020-09-26 VITALS — DIASTOLIC BLOOD PRESSURE: 24 MMHG | SYSTOLIC BLOOD PRESSURE: 106 MMHG

## 2020-09-26 VITALS — DIASTOLIC BLOOD PRESSURE: 35 MMHG | SYSTOLIC BLOOD PRESSURE: 116 MMHG

## 2020-09-26 VITALS — DIASTOLIC BLOOD PRESSURE: 37 MMHG | SYSTOLIC BLOOD PRESSURE: 123 MMHG

## 2020-09-26 VITALS — DIASTOLIC BLOOD PRESSURE: 46 MMHG | SYSTOLIC BLOOD PRESSURE: 119 MMHG

## 2020-09-26 VITALS — SYSTOLIC BLOOD PRESSURE: 125 MMHG | DIASTOLIC BLOOD PRESSURE: 33 MMHG

## 2020-09-26 VITALS — DIASTOLIC BLOOD PRESSURE: 32 MMHG | SYSTOLIC BLOOD PRESSURE: 124 MMHG

## 2020-09-26 VITALS — SYSTOLIC BLOOD PRESSURE: 110 MMHG | DIASTOLIC BLOOD PRESSURE: 33 MMHG

## 2020-09-26 VITALS — DIASTOLIC BLOOD PRESSURE: 41 MMHG | SYSTOLIC BLOOD PRESSURE: 105 MMHG

## 2020-09-26 VITALS — DIASTOLIC BLOOD PRESSURE: 30 MMHG | SYSTOLIC BLOOD PRESSURE: 115 MMHG

## 2020-09-26 VITALS — DIASTOLIC BLOOD PRESSURE: 28 MMHG | SYSTOLIC BLOOD PRESSURE: 133 MMHG

## 2020-09-26 VITALS — DIASTOLIC BLOOD PRESSURE: 32 MMHG | SYSTOLIC BLOOD PRESSURE: 118 MMHG

## 2020-09-26 VITALS — SYSTOLIC BLOOD PRESSURE: 113 MMHG | DIASTOLIC BLOOD PRESSURE: 34 MMHG

## 2020-09-26 VITALS — SYSTOLIC BLOOD PRESSURE: 126 MMHG | DIASTOLIC BLOOD PRESSURE: 38 MMHG

## 2020-09-26 VITALS — SYSTOLIC BLOOD PRESSURE: 112 MMHG | DIASTOLIC BLOOD PRESSURE: 28 MMHG

## 2020-09-26 VITALS — SYSTOLIC BLOOD PRESSURE: 128 MMHG | DIASTOLIC BLOOD PRESSURE: 37 MMHG

## 2020-09-26 VITALS — DIASTOLIC BLOOD PRESSURE: 36 MMHG | SYSTOLIC BLOOD PRESSURE: 136 MMHG

## 2020-09-26 VITALS — SYSTOLIC BLOOD PRESSURE: 117 MMHG | DIASTOLIC BLOOD PRESSURE: 39 MMHG

## 2020-09-26 VITALS — SYSTOLIC BLOOD PRESSURE: 125 MMHG | DIASTOLIC BLOOD PRESSURE: 34 MMHG

## 2020-09-26 VITALS — DIASTOLIC BLOOD PRESSURE: 34 MMHG | SYSTOLIC BLOOD PRESSURE: 114 MMHG

## 2020-09-26 VITALS — DIASTOLIC BLOOD PRESSURE: 35 MMHG | SYSTOLIC BLOOD PRESSURE: 117 MMHG

## 2020-09-26 VITALS — SYSTOLIC BLOOD PRESSURE: 120 MMHG | DIASTOLIC BLOOD PRESSURE: 31 MMHG

## 2020-09-26 VITALS — DIASTOLIC BLOOD PRESSURE: 37 MMHG | SYSTOLIC BLOOD PRESSURE: 112 MMHG

## 2020-09-26 VITALS — DIASTOLIC BLOOD PRESSURE: 26 MMHG | SYSTOLIC BLOOD PRESSURE: 112 MMHG

## 2020-09-26 VITALS — SYSTOLIC BLOOD PRESSURE: 123 MMHG | DIASTOLIC BLOOD PRESSURE: 40 MMHG

## 2020-09-26 VITALS — SYSTOLIC BLOOD PRESSURE: 121 MMHG | DIASTOLIC BLOOD PRESSURE: 35 MMHG

## 2020-09-26 VITALS — SYSTOLIC BLOOD PRESSURE: 110 MMHG | DIASTOLIC BLOOD PRESSURE: 45 MMHG

## 2020-09-26 VITALS — DIASTOLIC BLOOD PRESSURE: 38 MMHG | SYSTOLIC BLOOD PRESSURE: 121 MMHG

## 2020-09-26 VITALS — DIASTOLIC BLOOD PRESSURE: 36 MMHG | SYSTOLIC BLOOD PRESSURE: 113 MMHG

## 2020-09-26 VITALS — DIASTOLIC BLOOD PRESSURE: 30 MMHG | SYSTOLIC BLOOD PRESSURE: 124 MMHG

## 2020-09-26 VITALS — SYSTOLIC BLOOD PRESSURE: 120 MMHG | DIASTOLIC BLOOD PRESSURE: 37 MMHG

## 2020-09-26 VITALS — SYSTOLIC BLOOD PRESSURE: 115 MMHG | DIASTOLIC BLOOD PRESSURE: 31 MMHG

## 2020-09-26 VITALS — DIASTOLIC BLOOD PRESSURE: 35 MMHG | SYSTOLIC BLOOD PRESSURE: 130 MMHG

## 2020-09-26 VITALS — DIASTOLIC BLOOD PRESSURE: 33 MMHG | SYSTOLIC BLOOD PRESSURE: 114 MMHG

## 2020-09-26 VITALS — SYSTOLIC BLOOD PRESSURE: 120 MMHG | DIASTOLIC BLOOD PRESSURE: 36 MMHG

## 2020-09-26 VITALS — SYSTOLIC BLOOD PRESSURE: 125 MMHG | DIASTOLIC BLOOD PRESSURE: 38 MMHG

## 2020-09-26 VITALS — DIASTOLIC BLOOD PRESSURE: 37 MMHG | SYSTOLIC BLOOD PRESSURE: 118 MMHG

## 2020-09-26 VITALS — SYSTOLIC BLOOD PRESSURE: 121 MMHG | DIASTOLIC BLOOD PRESSURE: 36 MMHG

## 2020-09-26 VITALS — DIASTOLIC BLOOD PRESSURE: 29 MMHG | SYSTOLIC BLOOD PRESSURE: 118 MMHG

## 2020-09-26 VITALS — SYSTOLIC BLOOD PRESSURE: 112 MMHG | DIASTOLIC BLOOD PRESSURE: 25 MMHG

## 2020-09-26 VITALS — SYSTOLIC BLOOD PRESSURE: 130 MMHG | DIASTOLIC BLOOD PRESSURE: 38 MMHG

## 2020-09-26 VITALS — SYSTOLIC BLOOD PRESSURE: 122 MMHG | DIASTOLIC BLOOD PRESSURE: 31 MMHG

## 2020-09-26 VITALS — SYSTOLIC BLOOD PRESSURE: 121 MMHG | DIASTOLIC BLOOD PRESSURE: 32 MMHG

## 2020-09-26 VITALS — DIASTOLIC BLOOD PRESSURE: 36 MMHG | SYSTOLIC BLOOD PRESSURE: 114 MMHG

## 2020-09-26 VITALS — DIASTOLIC BLOOD PRESSURE: 57 MMHG | SYSTOLIC BLOOD PRESSURE: 127 MMHG

## 2020-09-26 LAB
% IRON SATURATION: 10 % (ref 15–50)
ADD MANUAL DIFF: YES
ADD MANUAL DIFF: YES
ALBUMIN SERPL-MCNC: 1.4 G/DL (ref 3.4–5)
ALBUMIN SERPL-MCNC: 1.4 G/DL (ref 3.4–5)
ALBUMIN/GLOB SERPL: 0.3 {RATIO} (ref 1–2.7)
ALBUMIN/GLOB SERPL: 0.4 {RATIO} (ref 1–2.7)
ALP SERPL-CCNC: 110 U/L (ref 46–116)
ALP SERPL-CCNC: 112 U/L (ref 46–116)
ALT SERPL-CCNC: 23 U/L (ref 12–78)
ALT SERPL-CCNC: 27 U/L (ref 12–78)
ANION GAP SERPL CALC-SCNC: 4 MMOL/L (ref 5–15)
ANION GAP SERPL CALC-SCNC: 5 MMOL/L (ref 5–15)
AST SERPL-CCNC: 39 U/L (ref 15–37)
AST SERPL-CCNC: 43 U/L (ref 15–37)
BILIRUB SERPL-MCNC: 0.7 MG/DL (ref 0.2–1)
BILIRUB SERPL-MCNC: 1 MG/DL (ref 0.2–1)
BUN SERPL-MCNC: 11 MG/DL (ref 7–18)
BUN SERPL-MCNC: 7 MG/DL (ref 7–18)
CALCIUM SERPL-MCNC: 7.7 MG/DL (ref 8.5–10.1)
CALCIUM SERPL-MCNC: 8 MG/DL (ref 8.5–10.1)
CHLORIDE SERPL-SCNC: 97 MMOL/L (ref 98–107)
CHLORIDE SERPL-SCNC: 97 MMOL/L (ref 98–107)
CO2 SERPL-SCNC: 25 MMOL/L (ref 21–32)
CO2 SERPL-SCNC: 28 MMOL/L (ref 21–32)
CREAT SERPL-MCNC: 0.5 MG/DL (ref 0.55–1.3)
CREAT SERPL-MCNC: 0.5 MG/DL (ref 0.55–1.3)
ERYTHROCYTE [DISTWIDTH] IN BLOOD BY AUTOMATED COUNT: 16.2 % (ref 11.6–14.8)
ERYTHROCYTE [DISTWIDTH] IN BLOOD BY AUTOMATED COUNT: 18.2 % (ref 11.6–14.8)
GLOBULIN SER-MCNC: 3.5 G/DL
GLOBULIN SER-MCNC: 4.3 G/DL
HCT VFR BLD CALC: 21.6 % (ref 42–52)
HCT VFR BLD CALC: 23.4 % (ref 42–52)
HGB BLD-MCNC: 7.4 G/DL (ref 14.2–18)
HGB BLD-MCNC: 7.7 G/DL (ref 14.2–18)
IRON SERPL-MCNC: 10 UG/DL (ref 50–175)
MCV RBC AUTO: 78 FL (ref 80–99)
MCV RBC AUTO: 82 FL (ref 80–99)
PLATELET # BLD: 182 K/UL (ref 150–450)
PLATELET # BLD: 198 K/UL (ref 150–450)
POTASSIUM SERPL-SCNC: 3 MMOL/L (ref 3.5–5.1)
POTASSIUM SERPL-SCNC: 3.1 MMOL/L (ref 3.5–5.1)
RBC # BLD AUTO: 2.76 M/UL (ref 4.7–6.1)
RBC # BLD AUTO: 2.86 M/UL (ref 4.7–6.1)
SODIUM SERPL-SCNC: 126 MMOL/L (ref 136–145)
SODIUM SERPL-SCNC: 130 MMOL/L (ref 136–145)
TIBC SERPL-MCNC: 100 UG/DL (ref 250–450)
UNSATURATED IRON BINDING: 90 UG/DL (ref 112–346)
WBC # BLD AUTO: 14.5 K/UL (ref 4.8–10.8)
WBC # BLD AUTO: 15 K/UL (ref 4.8–10.8)

## 2020-09-26 RX ADMIN — MEROPENEM SCH MLS/HR: 1 INJECTION INTRAVENOUS at 21:31

## 2020-09-26 RX ADMIN — LEVETIRACETAM SCH MG: 100 SOLUTION ORAL at 09:03

## 2020-09-26 RX ADMIN — HEPARIN SODIUM SCH UNITS: 5000 INJECTION INTRAVENOUS; SUBCUTANEOUS at 20:33

## 2020-09-26 RX ADMIN — SODIUM CHLORIDE AND POTASSIUM CHLORIDE SCH MLS/HR: 9; 1.49 INJECTION, SOLUTION INTRAVENOUS at 19:43

## 2020-09-26 RX ADMIN — Medication SCH MG: at 09:03

## 2020-09-26 RX ADMIN — SODIUM CHLORIDE AND POTASSIUM CHLORIDE SCH MLS/HR: 9; 1.49 INJECTION, SOLUTION INTRAVENOUS at 00:46

## 2020-09-26 RX ADMIN — SODIUM CHLORIDE AND POTASSIUM CHLORIDE SCH MLS/HR: 9; 1.49 INJECTION, SOLUTION INTRAVENOUS at 09:05

## 2020-09-26 RX ADMIN — SODIUM CHLORIDE SCH MLS/HR: 900 INJECTION, SOLUTION INTRAVENOUS at 17:29

## 2020-09-26 RX ADMIN — HEPARIN SODIUM SCH UNITS: 5000 INJECTION INTRAVENOUS; SUBCUTANEOUS at 09:00

## 2020-09-26 RX ADMIN — MEROPENEM SCH MLS/HR: 500 INJECTION INTRAVENOUS at 05:29

## 2020-09-26 RX ADMIN — CHLORHEXIDINE GLUCONATE SCH APPLIC: 213 SOLUTION TOPICAL at 19:38

## 2020-09-26 RX ADMIN — Medication SCH MG: at 17:33

## 2020-09-26 RX ADMIN — LEVETIRACETAM SCH MG: 100 SOLUTION ORAL at 20:33

## 2020-09-26 RX ADMIN — MEROPENEM SCH MLS/HR: 1 INJECTION INTRAVENOUS at 14:19

## 2020-09-26 RX ADMIN — ZINC SULFATE CAP 220 MG (50 MG ELEMENTAL ZN) SCH MG: 220 (50 ZN) CAP at 09:04

## 2020-09-26 RX ADMIN — SODIUM CHLORIDE SCH MLS/HR: 900 INJECTION, SOLUTION INTRAVENOUS at 02:51

## 2020-09-26 NOTE — CONSULTATION
History of Present Illness


General


Date patient seen:  Sep 24, 2020


Time patient seen:  23:00


Chief Complaint:  Hypotension and Rapid atrial fibrillation


Referring physician:  Maco


Reason for Consultation:  as above





Present Illness


HPI


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 

dependent.


Patient presents with hypotension and multiple lab abnormalities with clinical 

signs of sepsis.





PMH:   Chronic encephalopathy, comatose state, respiratory failure with trach, 

PAFib, NIDDM, Sz disorder, hypertension/HHD, hx of CHF, protein/calorie 

malnutrition.


Allergies:  


Coded Allergies:  


     No Known Allergies (Unverified , 9/24/20)





Medication History


Scheduled


Amlodipine Besylate* (Amlodipine Besylate*), 5 MG GT DAILY, (Reported)


Arginine/Ascorbate Sod/Abram AC (Arginaid Powder), 1 EACH GT BID, (Reported)


Ascorbic Acid* (Ascorbic Acid*), 500 MG GT TWICE A DAY, (Reported)


Bacitracin Zinc* (Bacitracin Zinc*), 1 APPLIC TOPIC DAILY, (Reported)


Carvedilol* (Carvedilol*), 25 MG ORAL EVERY 12 HOURS, (Reported)


Dextran 70/Hypromellose (Artificial Tears Eye Drops*), 1 DROP BOTH EYES Q12HR, 

(Reported)


Famotidine* (Pepcid 20mg tablet*), 20 MG GT TWICE A DAY, (Reported)


Furosemide* (Lasix*), 20 MG GT DAILY, (Reported)


Hydralazine Hcl* (Hydralazine Hcl*), 10 MG GT Q4HR, (Reported)


Levetiracetam (Levetiracetam), 1,500 MG GT Q12HR, (Reported)


Lorazepam* (Ativan*), 1 MG GT Q4HR, (Reported)


Povidone-Iodine (Betadine), ML TP DAILY, (Reported)


Sodium Chloride* (Sodium Chloride*), 1 GM GT TID, (Reported)


Zinc Oxide (Skin Protectant), GM TP DAILY, (Reported)


Zinc Sulfate (Zinc Sulfate*), 220 MG GT DAILY, (Reported)





Scheduled PRN


Acetaminophen* (Acetaminophen*), 650 MG GT Q4H PRN for Mild Pain/Temp > 100.5, 

(Reported)


Bisacodyl (Dulcolax), 10 MG RC Q24H PRN for Constipation, (Reported)


Magnesium Hydroxide* (Milk Of Magnesia*), 30 ML GT DAILY PRN for Constipation, 

(Reported)


Ondansetron* (Zofran*), 4 MG GT Q6H PRN for Nausea & Vomiting, (Reported)





Discontinued Medications


Levetiracetam (Keppra), 1,000 MG ORAL EVERY 12 HOURS, (Reported)


   Discontinued Reason: Prescription changed





Patient History


Limited by:  medical condition


History Provided By:  Medical Record


Healthcare decision maker





Resuscitation status





Advanced Directive on File








Physical Exam


General Appearance:  other - comatose


Lines, tubes and drains:  central line


HEENT:  other - temporal wasting


Neck:  trach


Respiratory/Chest:  rhonchi - bilaterally, on vent


Breasts:  no masses


Cardiovascular/Chest:  tachycardia, arrhythmia, irregularly irregular


Abdomen:  soft, decreased bowel sounds, feeding tube


Extremities:  no calf tenderness, trace edema


Skin Exam:  mottled


Neurologic:  unresponsiveness


Musculoskeletal:  atrophy





Last 24 Hour Vital Signs








  Date Time  Temp Pulse Resp B/P (MAP) Pulse Ox O2 Delivery O2 Flow Rate FiO2


 


9/25/20 23:08  106 27     40


 


9/25/20 22:45  100 23 118/32 (60) 100   


 


9/25/20 22:30  99 24 112/35 (60) 100   


 


9/25/20 22:15  99 20 114/29 (57) 100   


 


9/25/20 22:00    113/32    


 


9/25/20 22:00  96 21 113/33 (59) 100   


 


9/25/20 21:45 98.7 96 19 106/37 (60) 100   


 


9/25/20 21:30 98.8 95 21 110/42 (64) 100   


 


9/25/20 21:15  95 21 106/37 (60) 100   


 


9/25/20 21:00  92 22 102/41 (61) 100   


 


9/25/20 21:00    102/41    


 


9/25/20 20:45  92 21 103/34 (57) 100   


 


9/25/20 20:30  93 24 102/40 (60) 100   


 


9/25/20 20:15  91 21 98/30 (52) 100   


 


9/25/20 20:00 98.8 96 22 98/42 (60) 100   


 


9/25/20 20:00      Mechanical Ventilator  


 


9/25/20 20:00    98/42    


 


9/25/20 20:00        40


 


9/25/20 20:00  95      


 


9/25/20 19:30  97 24 96/46 (63) 100   


 


9/25/20 19:29    90/41    


 


9/25/20 19:15  93 20 86/36 (53) 100   


 


9/25/20 19:03  99 25     40


 


9/25/20 19:00    105/41    


 


9/25/20 19:00  96 20 105/41 (62) 100   


 


9/25/20 18:48 99.5       


 


9/25/20 18:45  111 29 119/55 (76) 100   


 


9/25/20 18:30  105 27 105/41 (62) 100   


 


9/25/20 18:15  106 24 106/43 (64) 100   


 


9/25/20 18:00    98/57    


 


9/25/20 18:00  107 24 98/57 (71) 100   


 


9/25/20 17:45  109 23 93/42 (59) 100   


 


9/25/20 17:30  116 27 103/38 (59) 100   


 


9/25/20 17:15  110 21 99/31 (53) 100   


 


9/25/20 17:00    102/31    


 


9/25/20 17:00  110 23 102/31 (54) 100   


 


9/25/20 16:45  107 26 99/27 (51) 100   


 


9/25/20 16:30  109 24 97/32 (53) 100   


 


9/25/20 16:15 102.1 113 23 110/35 (60) 100   


 


9/25/20 16:00  107      


 


9/25/20 16:00  107 25 104/34 (57) 100   


 


9/25/20 16:00    104/34    


 


9/25/20 16:00      Mechanical Ventilator  


 


9/25/20 15:57        40


 


9/25/20 15:45  106 23 104/28 (53) 100   


 


9/25/20 15:30  106 25 112/31 (58) 100   


 


9/25/20 15:15  108 19 104/32 (56) 100   


 


9/25/20 15:10  105 24     40


 


9/25/20 15:00  104 21 97/21 (46) 100   


 


9/25/20 15:00    97/21    


 


9/25/20 14:45  104 24 94/25 (48) 100   


 


9/25/20 14:30  105 24 96/19 (44) 100   


 


9/25/20 14:15  108 25 100/24 (49) 100   


 


9/25/20 14:00  110 23 109/27 (54) 100   


 


9/25/20 14:00    109/27    


 


9/25/20 13:45  112 23 116/34 (61) 100   


 


9/25/20 13:30  113 28 118/28 (58) 100   


 


9/25/20 13:15  115 27 117/24 (55) 100   


 


9/25/20 13:00    113/37    


 


9/25/20 13:00  118 27 113/37 (62) 100   


 


9/25/20 12:45  120 37 127/21 (56) 100   


 


9/25/20 12:38    106/25    


 


9/25/20 12:30  120 32 106/25 (52) 100   


 


9/25/20 12:15  117 31 126/34 (64) 100   


 


9/25/20 12:00  119      


 


9/25/20 12:00 99.2 117 35 137/47 (77) 100   


 


9/25/20 12:00        60


 


9/25/20 12:00      Mechanical Ventilator  


 


9/25/20 12:00    137/47    


 


9/25/20 11:45  120 35 116/39 (64) 100   


 


9/25/20 11:30  114 29 134/42 (72)    


 


9/25/20 11:15  110 29 123/34 (63) 100   


 


9/25/20 11:05  117 32     60


 


9/25/20 11:00    130/35    


 


9/25/20 11:00  109 24 130/35 (66) 100   


 


9/25/20 10:45  113 28 124/53 (76) 100   


 


9/25/20 10:30  112 36 134/39 (70) 100   


 


9/25/20 10:15  126 38 151/42 (78) 100   


 


9/25/20 10:00    114/56    


 


9/25/20 10:00  118 36 114/56 (75) 100   


 


9/25/20 09:45  108 36 142/40 (74) 100   


 


9/25/20 09:43  101  130/34    


 


9/25/20 09:30  100 30 130/34 (66) 100   


 


9/25/20 09:15  99 23 124/97 (106) 100   


 


9/25/20 09:00    122/40    


 


9/25/20 09:00  92 23 122/40 (67) 100   


 


9/25/20 08:45  95 24 127/36 (66) 100   


 


9/25/20 08:30    130/42    


 


9/25/20 08:30  87 21 130/42 (71) 100   


 


9/25/20 08:00        60


 


9/25/20 08:00 98.9 88 21 114/30 (58) 100   


 


9/25/20 08:00      Mechanical Ventilator  


 


9/25/20 08:00    114/30    


 


9/25/20 08:00  87      


 


9/25/20 07:30  88 21 120/26 (57) 100   


 


9/25/20 07:10  94 21     60


 


9/25/20 07:00    114/24    


 


9/25/20 07:00  90 22 114/24 (54) 100   


 


9/25/20 06:44    111/36    


 


9/25/20 06:00  107 48 131/52 (78) 100   


 


9/25/20 06:00    131/52    


 


9/25/20 05:01 100.3       


 


9/25/20 05:00  100 24 111/30 (57) 100   


 


9/25/20 05:00    112/35    


 


9/25/20 04:00    115/31    


 


9/25/20 04:00        80


 


9/25/20 04:00 100.5 102 34 115/31 (59) 100   


 


9/25/20 04:00      Mechanical Ventilator  


 


9/25/20 03:31  100      


 


9/25/20 03:22  105 20     80


 


9/25/20 03:00    106/45    


 


9/25/20 03:00  97 20 106/45 (65) 100   


 


9/25/20 02:14    100/34    


 


9/25/20 02:00  97 20 100/34 (56) 100   


 


9/25/20 01:00  101 20 101/25 (50) 100   


 


9/25/20 00:00      Mechanical Ventilator  


 


9/25/20 00:00    103/31    


 


9/25/20 00:00 100.3 112 25 84/42 (56) 100   

















Intake and Output  


 


 9/24/20 9/25/20





 19:00 07:00


 


Intake Total 3072.5 ml 1887.6110 ml


 


Output Total  475 ml


 


Balance 3072.5 ml 1412.6110 ml


 


  


 


IV Total 3072.5 ml 1857.6110 ml


 


Other  30 ml


 


Output Urine Total  475 ml











Laboratory Tests








Test


 9/25/20


05:00 9/25/20


10:00


 


Lactic Acid Level


 1.80 mmol/L


(0.4-2.0) 





 


White Blood Count


 


 17.8 K/UL


(4.8-10.8)  #H


 


Red Blood Count


 


 2.70 M/UL


(4.70-6.10)  L


 


Hemoglobin


 


 7.3 G/DL


(14.2-18.0)  L


 


Hematocrit


 


 21.5 %


(42.0-52.0)  L


 


Mean Corpuscular Volume  80 FL (80-99)  


 


Mean Corpuscular Hemoglobin


 


 26.9 PG


(27.0-31.0)  L


 


Mean Corpuscular Hemoglobin


Concent 


 33.7 G/DL


(32.0-36.0)


 


Red Cell Distribution Width


 


 16.2 %


(11.6-14.8)  H


 


Platelet Count


 


 246 K/UL


(150-450)


 


Mean Platelet Volume


 


 7.1 FL


(6.5-10.1)


 


Neutrophils (%) (Auto)


 


 % (45.0-75.0)





 


Lymphocytes (%) (Auto)


 


 % (20.0-45.0)





 


Monocytes (%) (Auto)   % (1.0-10.0)  


 


Eosinophils (%) (Auto)   % (0.0-3.0)  


 


Basophils (%) (Auto)   % (0.0-2.0)  


 


Differential Total Cells


Counted 


 100  





 


Neutrophils % (Manual)  82 % (45-75)  H


 


Lymphocytes % (Manual)  14 % (20-45)  L


 


Monocytes % (Manual)  3 % (1-10)  


 


Eosinophils % (Manual)  0 % (0-3)  


 


Basophils % (Manual)  0 % (0-2)  


 


Band Neutrophils  1 % (0-8)  


 


Platelet Estimate  Adequate  


 


Platelet Morphology  Normal  


 


Hypochromasia  3+  


 


Anisocytosis  1+  


 


Sodium Level


 


 131 MMOL/L


(136-145)  L


 


Potassium Level


 


 3.3 MMOL/L


(3.5-5.1)  L


 


Chloride Level


 


 97 MMOL/L


()  L


 


Carbon Dioxide Level


 


 26 MMOL/L


(21-32)


 


Anion Gap


 


 8 mmol/L


(5-15)


 


Blood Urea Nitrogen


 


 12 mg/dL


(7-18)


 


Creatinine


 


 0.6 MG/DL


(0.55-1.30)


 


Estimat Glomerular Filtration


Rate 


 > 60 mL/min


(>60)


 


Glucose Level


 


 143 MG/DL


()  H


 


Calcium Level


 


 7.6 MG/DL


(8.5-10.1)  L


 


Total Bilirubin


 


 0.8 MG/DL


(0.2-1.0)


 


Aspartate Amino Transf


(AST/SGOT) 


 45 U/L (15-37)


H


 


Alanine Aminotransferase


(ALT/SGPT) 


 26 U/L (12-78)





 


Alkaline Phosphatase


 


 144 U/L


()  H


 


Total Protein


 


 5.8 G/DL


(6.4-8.2)  L


 


Albumin


 


 1.5 G/DL


(3.4-5.0)  L


 


Globulin  4.3 g/dL  


 


Albumin/Globulin Ratio


 


 0.3 (1.0-2.7)


L











Microbiology








 Date/Time


Source Procedure


Growth Status





 


 9/25/20 06:00


Rectum  Received








Height (Feet):  5


Height (Inches):  10.00


Weight (Pounds):  200


Medications





Current Medications








 Medications


  (Trade)  Dose


 Ordered  Sig/Tony


 Route


 PRN Reason  Start Time


 Stop Time Status Last Admin


Dose Admin


 


 Acetaminophen


  (Tylenol)  650 mg  Q6H  PRN


 GT


 Mild Pain (Pain Scale 1-3)  9/24/20 18:45


 10/24/20 18:44  9/24/20 21:39





 


 Acetaminophen


  (Tylenol)  650 mg  Q6H  PRN


 GT


 Temp >100.5  9/24/20 19:15


 10/24/20 19:14  9/25/20 17:13





 


 Ascorbic Acid


  (Vitamin C)  500 mg  TWICE A  DAY


 GT


   9/25/20 09:00


 10/25/20 08:59  9/25/20 17:13





 


 Bisacodyl


  (Dulcolax)  10 mg  DAILYPRN  PRN


 RECTAL


 Constipation  9/24/20 18:45


 12/23/20 18:44   





 


 Chlorhexidine


 Gluconate


  (Holli-Hex 2%)  1 applic  DAILY@2000


 TOPIC


   9/25/20 20:00


 12/24/20 19:59  9/25/20 20:15





 


 Famotidine


  (Pepcid)  20 mg  TWICE A  DAY


 GT


   9/24/20 18:45


 12/23/20 18:44  9/25/20 17:13





 


 Heparin Sodium


  (Porcine)


  (Heparin 5000


 units/ml)  5,000 units  EVERY 12  HOURS


 SUBQ


   9/24/20 21:00


 11/8/20 20:59  9/24/20 21:39





 


 Heparin Sodium/


 Sodium Chloride


  (Heparin 1000


 units/500ml


 Premix)  1,000 unit  ONCE  PRN


 IV


 PICC  9/25/20 08:00


 9/25/20 23:59   





 


 Levetiracetam


  (Keppra)  1,000 mg  EVERY 12  HOURS


 GT


   9/24/20 21:00


 10/24/20 20:59  9/25/20 21:03





 


 Lidocaine HCl


  (Xylocaine 1%


 30ml)  30 ml  ONCE  PRN


 INJ


 PICC  9/25/20 08:03


 9/25/20 23:59   





 


 Meropenem 500 mg/


 Sodium Chloride  55 ml @ 


 110 mls/hr  EVERY 8  HOURS


 IVPB


   9/25/20 14:00


 9/30/20 13:59  9/25/20 21:04





 


 Norepinephrine


 Bitartrate 8 mg/


 Dextrose  500 ml @ 0


 mls/hr  Q24H


 IV


   9/24/20 22:30


 9/27/20 22:29  9/25/20 19:29





 


 Potassium


 Chloride 20 meq/


 Sodium Chloride  1,010 ml @ 


 100 mls/hr  [id number]


 IV


   9/26/20 23:30


 10/26/20 23:29   





 


 Zinc Sulfate


  (Zinc Sulfate)  220 mg  DAILY


 GT


   9/24/20 21:00


 12/23/20 20:59  9/25/20 09:43














Assessment/Plan


Assessment/Plan:


Critical & Guarded


Sepsis with shock


HC assoc PNA


Lactic acidosis


Anemia


Atrial fibrillation with RVR


Respiratory failure with trach








IVF


Pressors


Antimicrobials


Digitalize if rate control needed


DVT and stress ulcer prophyl


Serial lactic acid levels and hemoglobin levels


F/U lytes and replace as needed


Vent support











Jamie Jo MD                Sep 26, 2020 00:04

## 2020-09-26 NOTE — CARDIOLOGY PROGRESS NOTE
Subjective


DATE OF SERVICE:  Sep 26, 2020


Patient remains in ICU


On tapering pressor support for hypotension.


Low hemoglobin, K+, and Mg++ noted.





Objective





Last 24 Hour Vital Signs








  Date Time  Temp Pulse Resp B/P (MAP) Pulse Ox O2 Delivery O2 Flow Rate FiO2


 


9/26/20 19:58  91 28     40


 


9/26/20 19:00  96 23 125/33 (63) 100   


 


9/26/20 19:00    125/33    


 


9/26/20 18:00    127/49    


 


9/26/20 18:00  88 22 128/37 (67) 100   


 


9/26/20 17:29    127/57    


 


9/26/20 17:00  87 23 127/57 (80) 100   


 


9/26/20 16:00  90      


 


9/26/20 16:00      Mechanical Ventilator  


 


9/26/20 16:00 98.7 90 25 125/34 (64) 100   


 


9/26/20 16:00        40


 


9/26/20 16:00    125/37    


 


9/26/20 15:30  89 20 121/38 (65) 100   


 


9/26/20 15:08  85 21     40


 


9/26/20 15:00  90 24 123/36 (65) 100   


 


9/26/20 15:00    126/37    


 


9/26/20 14:30  91 21 126/38 (67) 100   


 


9/26/20 14:00  91 23 124/32 (62) 100   


 


9/26/20 14:00    122/40    


 


9/26/20 13:00  93 23 120/36 (64) 100   


 


9/26/20 13:00    120/33    


 


9/26/20 12:00        40


 


9/26/20 12:00      Mechanical Ventilator  


 


9/26/20 12:00    112/40    


 


9/26/20 12:00  93      


 


9/26/20 12:00 98.5 96 27 118/32 (60) 100   


 


9/26/20 11:14  97 24     40


 


9/26/20 11:00  94 22 114/33 (60) 100   


 


9/26/20 11:00    114/30    


 


9/26/20 10:30  99 21 120/31 (60) 100   


 


9/26/20 10:00  99 23 115/30 (58) 100   


 


9/26/20 10:00    115/30    


 


9/26/20 09:30  99 22 121/32 (61) 100   


 


9/26/20 09:05    117/35    


 


9/26/20 09:00  101 25 117/35 (62) 100   


 


9/26/20 08:30  104 24 113/34 (60) 100   


 


9/26/20 08:00  101      


 


9/26/20 08:00      Mechanical Ventilator  


 


9/26/20 08:00    121/36    


 


9/26/20 08:00        40


 


9/26/20 08:00 100.3 103 22 121/36 (64) 100   


 


9/26/20 07:09  100 27     40


 


9/26/20 07:00  100 24 116/35 (62) 100   


 


9/26/20 07:00    116/35    


 


9/26/20 07:00  100 24 116/35 (62) 100   


 


9/26/20 06:45  102 24 115/30 (58) 100   


 


9/26/20 06:30  104 25 118/37 (64) 100   


 


9/26/20 06:15  102 23 120/37 (64) 100   


 


9/26/20 06:00  102 23 112/36 (61) 100   


 


9/26/20 06:00    112/36    


 


9/26/20 05:45  102 24 114/34 (60) 100   


 


9/26/20 05:30  103 30 115/31 (59) 100   


 


9/26/20 05:15  103 25 112/37 (62) 100   


 


9/26/20 05:00  102 22 115/38 (63) 100   


 


9/26/20 05:00  102 22 115/38 (63) 100   


 


9/26/20 05:00    115/38    


 


9/26/20 04:45  108 27 130/38 (68) 100   


 


9/26/20 04:30  102 22 113/28 (56) 100   


 


9/26/20 04:15  102 23 112/28 (56) 100   


 


9/26/20 04:00      Mechanical Ventilator  


 


9/26/20 04:00        40


 


9/26/20 04:00    112/25    


 


9/26/20 04:00  106      


 


9/26/20 04:00 99.3 105 27 112/25 (54) 100   


 


9/26/20 03:45  104 25 110/33 (58) 100   


 


9/26/20 03:30  105 26 106/34 (58) 100   


 


9/26/20 03:15  105 28 106/24 (51) 100   


 


9/26/20 03:08  104 26     40


 


9/26/20 03:00  105 24 112/26 (54) 100   


 


9/26/20 03:00    112/26    


 


9/26/20 02:51    110/45    


 


9/26/20 02:45  113 25 110/45 (66) 100   


 


9/26/20 02:30  112 23 105/41 (62) 100   


 


9/26/20 02:15  111 24 114/36 (62) 100   


 


9/26/20 02:00  112 22 113/36 (61) 100   


 


9/26/20 02:00    113/36    


 


9/26/20 01:45  111 26 118/29 (58) 100   


 


9/26/20 01:30    119/46    


 


9/26/20 01:30  113 26 119/46 (70) 100   


 


9/26/20 01:15  116 28 122/31 (61) 100   


 


9/26/20 01:00  116 27 117/39 (65) 100   


 


9/26/20 00:45  116 29 121/35 (63) 100   


 


9/26/20 00:30    130/35    


 


9/26/20 00:30  114 27 130/35 (66) 100   


 


9/26/20 00:15  113 33 133/28 (63) 100   


 


9/26/20 00:00      Mechanical Ventilator  


 


9/26/20 00:00 99.0 113 29 136/36 (69) 100   


 


9/26/20 00:00        40


 


9/26/20 00:00    136/36    


 


9/26/20 00:00  113      


 


9/25/20 23:45  109 26 145/33 (70) 100   


 


9/25/20 23:30  107 28 125/31 (62) 100   


 


9/25/20 23:15  106 23 121/28 (59) 100   


 


9/25/20 23:08  106 27     40


 


9/25/20 23:00    117/34    


 


9/25/20 23:00  101 21 117/34 (61) 100   


 


9/25/20 22:45  100 23 118/32 (60) 100   


 


9/25/20 22:30  99 24 112/35 (60) 100   


 


9/25/20 22:15  99 20 114/29 (57) 100   


 


9/25/20 22:00    113/32    


 


9/25/20 22:00  96 21 113/33 (59) 100   


 


9/25/20 21:45 98.7 96 19 106/37 (60) 100   


 


9/25/20 21:30 98.8 95 21 110/42 (64) 100   








ROS: unchanged from my note of 9/24/20.


HEENT:  Thin Trach secretions


RHYTHM:  Afib


LUNGS:  bilateral rhonchi


CARDIAC:  irregularly irregular


ABDOMEN:  non tender, soft, distended, G-Tube intact


EXTREMITIES:  trace edema, other - Neuro: comatose state





Laboratory Tests








Test


 9/26/20


04:00 9/26/20


18:30


 


White Blood Count


 15.0 K/UL


(4.8-10.8)  H 14.5 K/UL


(4.8-10.8)  H


 


Red Blood Count


 2.76 M/UL


(4.70-6.10)  L 2.86 M/UL


(4.70-6.10)  L


 


Hemoglobin


 7.4 G/DL


(14.2-18.0)  L 7.7 G/DL


(14.2-18.0)  L


 


Hematocrit


 21.6 %


(42.0-52.0)  L 23.4 %


(42.0-52.0)  L


 


Mean Corpuscular Volume


 78 FL (80-99)


L 82 FL (80-99)  





 


Mean Corpuscular Hemoglobin


 26.8 PG


(27.0-31.0)  L 27.0 PG


(27.0-31.0)


 


Mean Corpuscular Hemoglobin


Concent 34.2 G/DL


(32.0-36.0) 33.0 G/DL


(32.0-36.0)


 


Red Cell Distribution Width


 16.2 %


(11.6-14.8)  H 18.2 %


(11.6-14.8)  H


 


Platelet Count


 198 K/UL


(150-450) 182 K/UL


(150-450)


 


Mean Platelet Volume


 7.6 FL


(6.5-10.1) 8.4 FL


(6.5-10.1)


 


Neutrophils (%) (Auto)


 % (45.0-75.0)


 % (45.0-75.0)





 


Lymphocytes (%) (Auto)


 % (20.0-45.0)


 % (20.0-45.0)





 


Monocytes (%) (Auto)  % (1.0-10.0)    % (1.0-10.0)  


 


Eosinophils (%) (Auto)  % (0.0-3.0)    % (0.0-3.0)  


 


Basophils (%) (Auto)  % (0.0-2.0)    % (0.0-2.0)  


 


Differential Total Cells


Counted 100  


 100  





 


Neutrophils % (Manual) 85 % (45-75)  H 77 % (45-75)  H


 


Lymphocytes % (Manual) 9 % (20-45)  L 15 % (20-45)  L


 


Monocytes % (Manual) 6 % (1-10)   8 % (1-10)  


 


Eosinophils % (Manual) 0 % (0-3)   0 % (0-3)  


 


Basophils % (Manual) 0 % (0-2)   0 % (0-2)  


 


Band Neutrophils 0 % (0-8)   0 % (0-8)  


 


Platelet Estimate Adequate   Adequate  


 


Platelet Morphology Normal   Normal  


 


Hypochromasia 3+   1+  


 


Anisocytosis 1+   2+  


 


Microcytosis 1+   


 


Spherocytes 2+   


 


Sodium Level


 130 MMOL/L


(136-145)  L 126 MMOL/L


(136-145)  L


 


Potassium Level


 3.0 MMOL/L


(3.5-5.1)  L 3.1 MMOL/L


(3.5-5.1)  L


 


Chloride Level


 97 MMOL/L


()  L 97 MMOL/L


()  L


 


Carbon Dioxide Level


 28 MMOL/L


(21-32) 25 MMOL/L


(21-32)


 


Anion Gap


 5 mmol/L


(5-15) 4 mmol/L


(5-15)  L


 


Blood Urea Nitrogen


 11 mg/dL


(7-18) 7 mg/dL (7-18)





 


Creatinine


 0.5 MG/DL


(0.55-1.30)  L 0.5 MG/DL


(0.55-1.30)  L


 


Estimat Glomerular Filtration


Rate > 60 mL/min


(>60) > 60 mL/min


(>60)


 


Glucose Level


 108 MG/DL


()  H 149 MG/DL


()  H


 


Calcium Level


 7.7 MG/DL


(8.5-10.1)  L 8.0 MG/DL


(8.5-10.1)  L


 


Total Bilirubin


 1.0 MG/DL


(0.2-1.0) 0.7 MG/DL


(0.2-1.0)


 


Aspartate Amino Transf


(AST/SGOT) 43 U/L (15-37)


H 39 U/L (15-37)


H


 


Alanine Aminotransferase


(ALT/SGPT) 27 U/L (12-78)


 23 U/L (12-78)





 


Alkaline Phosphatase


 110 U/L


() 112 U/L


()


 


Total Protein


 4.9 G/DL


(6.4-8.2)  L 5.7 G/DL


(6.4-8.2)  L


 


Albumin


 1.4 G/DL


(3.4-5.0)  L 1.4 G/DL


(3.4-5.0)  L


 


Globulin 3.5 g/dL   4.3 g/dL  


 


Albumin/Globulin Ratio


 0.4 (1.0-2.7)


L 0.3 (1.0-2.7)


L


 


Thyroid Stimulating Hormone


(TSH) 1.765 uiU/mL


(0.358-3.740) 





 


Stool Occult Blood  Pending  


 


Iron Level


 


 10 ug/dL


()  L


 


Total Iron Binding Capacity


 


 100 ug/dL


(250-450)  L


 


Percent Iron Saturation  10 % (15-50)  L


 


Unsaturated Iron Binding


 


 90 ug/dL


(112-346)  L











Microbiology








 Date/Time


Source Procedure


Growth Status





 


 9/25/20 21:45


Stool Clostridium difficile Toxin Assay - Final Complete


 


 9/25/20 06:00


Rectum  Received


 


 9/24/20 16:30


Urine,Clean Catch Urine Culture - Final


Escherichia Coli - Esbl Complete


 


 9/24/20 16:30


Blood Blood Culture - Preliminary


Gram Negative Aguila Resulted


 


 9/24/20 16:15


Nasopharynx SARS-CoV-2 RdRp Gene Assay - Final Complete


 


 9/24/20 16:15


Blood Blood Culture - Preliminary


Gram Negative Aguila Resulted











Assessment/Plan


Assessment/Plan


Sepsis with shock


Respiratory failure


Chronic vegetative state


Anemia


Hypokalemia - total body depl


Hypomagnesemia - severe


Lactic acidosis


PAFib with RVR


CRITICAL & GUARDED











Abx


 Resp support


IVF hydration


Taper off pressors


Hold carvedilol and amlodipine until off pressors


PRBC transfusion


Additional IV Mg++ and K+











Jamie Jo MD                Sep 26, 2020 21:19

## 2020-09-26 NOTE — GENERAL PROGRESS NOTE
Subjective


ROS Limited/Unobtainable:  No


Constitutional:  Reports: malaise, weakness


HEENT:  Reports: no symptoms


Cardiovascular:  Reports: no symptoms


Respiratory:  Reports: cough, shortness of breath, sputum


Gastrointestinal/Abdominal:  Reports: difficulty swallowing


Genitourinary:  Reports: no symptoms


Neurologic/Psychiatric:  Reports: pre-existing deficit


Endocrine:  Reports: no symptoms


Hematologic/Lymphatic:  Reports: anemia


Allergies:  


Coded Allergies:  


     No Known Allergies (Unverified , 9/24/20)


All Systems:  reviewed and negative except above


Subjective


no events. decreased pressors requirements. no fevers. decrease h/h noted. low k

and sodium





Objective





Last 24 Hour Vital Signs








  Date Time  Temp Pulse Resp B/P (MAP) Pulse Ox O2 Delivery O2 Flow Rate FiO2


 


9/26/20 08:00  101      


 


9/26/20 07:00  100 24 116/35 (62) 100   


 


9/26/20 07:00    116/35    


 


9/26/20 07:00  100 24 116/35 (62) 100   


 


9/26/20 06:45  102 24 115/30 (58) 100   


 


9/26/20 06:30  104 25 118/37 (64) 100   


 


9/26/20 06:15  102 23 120/37 (64) 100   


 


9/26/20 06:00  102 23 112/36 (61) 100   


 


9/26/20 06:00    112/36    


 


9/26/20 05:45  102 24 114/34 (60) 100   


 


9/26/20 05:30  103 30 115/31 (59) 100   


 


9/26/20 05:15  103 25 112/37 (62) 100   


 


9/26/20 05:00  102 22 115/38 (63) 100   


 


9/26/20 05:00  102 22 115/38 (63) 100   


 


9/26/20 05:00    115/38    


 


9/26/20 04:45  108 27 130/38 (68) 100   


 


9/26/20 04:30  102 22 113/28 (56) 100   


 


9/26/20 04:15  102 23 112/28 (56) 100   


 


9/26/20 04:00      Mechanical Ventilator  


 


9/26/20 04:00        40


 


9/26/20 04:00    112/25    


 


9/26/20 04:00  106      


 


9/26/20 04:00 99.3 105 27 112/25 (54) 100   


 


9/26/20 03:45  104 25 110/33 (58) 100   


 


9/26/20 03:30  105 26 106/34 (58) 100   


 


9/26/20 03:15  105 28 106/24 (51) 100   


 


9/26/20 03:08  104 26     40


 


9/26/20 03:00  105 24 112/26 (54) 100   


 


9/26/20 03:00    112/26    


 


9/26/20 02:51    110/45    


 


9/26/20 02:45  113 25 110/45 (66) 100   


 


9/26/20 02:30  112 23 105/41 (62) 100   


 


9/26/20 02:15  111 24 114/36 (62) 100   


 


9/26/20 02:00  112 22 113/36 (61) 100   


 


9/26/20 02:00    113/36    


 


9/26/20 01:45  111 26 118/29 (58) 100   


 


9/26/20 01:30    119/46    


 


9/26/20 01:30  113 26 119/46 (70) 100   


 


9/26/20 01:15  116 28 122/31 (61) 100   


 


9/26/20 01:00  116 27 117/39 (65) 100   


 


9/26/20 00:45  116 29 121/35 (63) 100   


 


9/26/20 00:30    130/35    


 


9/26/20 00:30  114 27 130/35 (66) 100   


 


9/26/20 00:15  113 33 133/28 (63) 100   


 


9/26/20 00:00      Mechanical Ventilator  


 


9/26/20 00:00 99.0 113 29 136/36 (69) 100   


 


9/26/20 00:00        40


 


9/26/20 00:00    136/36    


 


9/26/20 00:00  113      


 


9/25/20 23:45  109 26 145/33 (70) 100   


 


9/25/20 23:30  107 28 125/31 (62) 100   


 


9/25/20 23:15  106 23 121/28 (59) 100   


 


9/25/20 23:08  106 27     40


 


9/25/20 23:00    117/34    


 


9/25/20 23:00  101 21 117/34 (61) 100   


 


9/25/20 22:45  100 23 118/32 (60) 100   


 


9/25/20 22:30  99 24 112/35 (60) 100   


 


9/25/20 22:15  99 20 114/29 (57) 100   


 


9/25/20 22:00    113/32    


 


9/25/20 22:00  96 21 113/33 (59) 100   


 


9/25/20 21:45 98.7 96 19 106/37 (60) 100   


 


9/25/20 21:30 98.8 95 21 110/42 (64) 100   


 


9/25/20 21:15  95 21 106/37 (60) 100   


 


9/25/20 21:00  92 22 102/41 (61) 100   


 


9/25/20 21:00    102/41    


 


9/25/20 20:45  92 21 103/34 (57) 100   


 


9/25/20 20:30  93 24 102/40 (60) 100   


 


9/25/20 20:15  91 21 98/30 (52) 100   


 


9/25/20 20:00 98.8 96 22 98/42 (60) 100   


 


9/25/20 20:00      Mechanical Ventilator  


 


9/25/20 20:00    98/42    


 


9/25/20 20:00        40


 


9/25/20 20:00  95      


 


9/25/20 19:30  97 24 96/46 (63) 100   


 


9/25/20 19:29    90/41    


 


9/25/20 19:15  93 20 86/36 (53) 100   


 


9/25/20 19:03  99 25     40


 


9/25/20 19:00    105/41    


 


9/25/20 19:00  96 20 105/41 (62) 100   


 


9/25/20 18:48 99.5       


 


9/25/20 18:45  111 29 119/55 (76) 100   


 


9/25/20 18:30  105 27 105/41 (62) 100   


 


9/25/20 18:15  106 24 106/43 (64) 100   


 


9/25/20 18:00    98/57    


 


9/25/20 18:00  107 24 98/57 (71) 100   


 


9/25/20 17:45  109 23 93/42 (59) 100   


 


9/25/20 17:30  116 27 103/38 (59) 100   


 


9/25/20 17:15  110 21 99/31 (53) 100   


 


9/25/20 17:00    102/31    


 


9/25/20 17:00  110 23 102/31 (54) 100   


 


9/25/20 16:45  107 26 99/27 (51) 100   


 


9/25/20 16:30  109 24 97/32 (53) 100   


 


9/25/20 16:15 102.1 113 23 110/35 (60) 100   


 


9/25/20 16:00  107      


 


9/25/20 16:00  107 25 104/34 (57) 100   


 


9/25/20 16:00    104/34    


 


9/25/20 16:00      Mechanical Ventilator  


 


9/25/20 15:57        40


 


9/25/20 15:45  106 23 104/28 (53) 100   


 


9/25/20 15:30  106 25 112/31 (58) 100   


 


9/25/20 15:15  108 19 104/32 (56) 100   


 


9/25/20 15:10  105 24     40


 


9/25/20 15:00  104 21 97/21 (46) 100   


 


9/25/20 15:00    97/21    


 


9/25/20 14:45  104 24 94/25 (48) 100   


 


9/25/20 14:30  105 24 96/19 (44) 100   


 


9/25/20 14:15  108 25 100/24 (49) 100   


 


9/25/20 14:00  110 23 109/27 (54) 100   


 


9/25/20 14:00    109/27    


 


9/25/20 13:45  112 23 116/34 (61) 100   


 


9/25/20 13:30  113 28 118/28 (58) 100   


 


9/25/20 13:15  115 27 117/24 (55) 100   


 


9/25/20 13:00    113/37    


 


9/25/20 13:00  118 27 113/37 (62) 100   


 


9/25/20 12:45  120 37 127/21 (56) 100   


 


9/25/20 12:38    106/25    


 


9/25/20 12:30  120 32 106/25 (52) 100   


 


9/25/20 12:15  117 31 126/34 (64) 100   


 


9/25/20 12:00  119      


 


9/25/20 12:00 99.2 117 35 137/47 (77) 100   


 


9/25/20 12:00        60


 


9/25/20 12:00      Mechanical Ventilator  


 


9/25/20 12:00    137/47    


 


9/25/20 11:45  120 35 116/39 (64) 100   


 


9/25/20 11:30  114 29 134/42 (72)    


 


9/25/20 11:15  110 29 123/34 (63) 100   


 


9/25/20 11:05  117 32     60


 


9/25/20 11:00    130/35    


 


9/25/20 11:00  109 24 130/35 (66) 100   


 


9/25/20 10:45  113 28 124/53 (76) 100   


 


9/25/20 10:30  112 36 134/39 (70) 100   


 


9/25/20 10:15  126 38 151/42 (78) 100   

















Intake and Output  


 


 9/25/20 9/26/20





 19:00 07:00


 


Intake Total 2575.50 ml 2284.625 ml


 


Output Total 795 ml 800 ml


 


Balance 1780.50 ml 1484.625 ml


 


  


 


Intake Free Water 60 ml 


 


IV Total 2455.50 ml 2034.625 ml


 


Blood Product  250 ml


 


Other 60 ml 


 


Output Urine Total 795 ml 700 ml


 


Stool Total  100 ml


 


# Bowel Movements 1 2








Laboratory Tests


9/26/20 04:00: 


White Blood Count 15.0H, Red Blood Count 2.76L, Hemoglobin 7.4L, Hematocrit 

21.6L, Mean Corpuscular Volume 78L, Mean Corpuscular Hemoglobin 26.8L, Mean 

Corpuscular Hemoglobin Concent 34.2, Red Cell Distribution Width 16.2H, Platelet

Count 198, Mean Platelet Volume 7.6, Neutrophils (%) (Auto) , Lymphocytes (%) 

(Auto) , Monocytes (%) (Auto) , Eosinophils (%) (Auto) , Basophils (%) (Auto) , 

Differential Total Cells Counted 100, Neutrophils % (Manual) 85H, Lymphocytes % 

(Manual) 9L, Monocytes % (Manual) 6, Eosinophils % (Manual) 0, Basophils % 

(Manual) 0, Band Neutrophils 0, Platelet Estimate Adequate, Platelet Morphology 

Normal, Hypochromasia 3+, Anisocytosis 1+, Microcytosis 1+, Spherocytes 2+, 

Sodium Level 130L, Potassium Level 3.0L, Chloride Level 97L, Carbon Dioxide 

Level 28, Anion Gap 5, Blood Urea Nitrogen 11, Creatinine 0.5L, Estimat Glomer

ular Filtration Rate > 60, Glucose Level 108H, Calcium Level 7.7L, Total 

Bilirubin 1.0, Aspartate Amino Transf (AST/SGOT) 43H, Alanine Aminotransferase 

(ALT/SGPT) 27, Alkaline Phosphatase 110, Total Protein 4.9L, Albumin 1.4L, 

Globulin 3.5, Albumin/Globulin Ratio 0.4L, Thyroid Stimulating Hormone (TSH) 

1.765


Height (Feet):  5


Height (Inches):  10.00


Weight (Pounds):  200


General Appearance:  WD/WN, lethargic, confused


EENT:  normal ENT inspection


Neck:  non-tender, normal alignment


Cardiovascular:  normal peripheral pulses, normal rate, regular rhythm


Respiratory/Chest:  chest wall non-tender, lungs clear, normal breath sounds


Abdomen:  normal bowel sounds, non tender, soft, no organomegaly


Edema:  no edema noted Arm (L), no edema noted Arm (R)


Neurologic:  disoriented, unresponsive





Assessment/Plan


Problem List:  


(1) Pneumonia


ICD Codes:  J18.9 - Pneumonia, unspecified organism


SNOMED:  830838736


(2) Rapid atrial fibrillation


ICD Codes:  I48.91 - Unspecified atrial fibrillation


SNOMED:  776815758


(3) Sepsis


ICD Codes:  A41.9 - Sepsis, unspecified organism


SNOMED:  35096238


Status:  stable


Assessment/Plan:


cont pressors


wean as able


iv abx


follow up cultures


transfuse as needed


monitor for bleeding


replace lytes


PPI rx


check stool ob


check iron panel


cards and ID appreciated


critical and guarded











Brijesh Dewey MD              Sep 26, 2020 10:03

## 2020-09-27 VITALS — SYSTOLIC BLOOD PRESSURE: 129 MMHG | DIASTOLIC BLOOD PRESSURE: 35 MMHG

## 2020-09-27 VITALS — SYSTOLIC BLOOD PRESSURE: 125 MMHG | DIASTOLIC BLOOD PRESSURE: 37 MMHG

## 2020-09-27 VITALS — SYSTOLIC BLOOD PRESSURE: 127 MMHG | DIASTOLIC BLOOD PRESSURE: 44 MMHG

## 2020-09-27 VITALS — SYSTOLIC BLOOD PRESSURE: 126 MMHG | DIASTOLIC BLOOD PRESSURE: 36 MMHG

## 2020-09-27 VITALS — SYSTOLIC BLOOD PRESSURE: 127 MMHG | DIASTOLIC BLOOD PRESSURE: 35 MMHG

## 2020-09-27 VITALS — SYSTOLIC BLOOD PRESSURE: 124 MMHG | DIASTOLIC BLOOD PRESSURE: 40 MMHG

## 2020-09-27 VITALS — SYSTOLIC BLOOD PRESSURE: 124 MMHG | DIASTOLIC BLOOD PRESSURE: 36 MMHG

## 2020-09-27 VITALS — DIASTOLIC BLOOD PRESSURE: 29 MMHG | SYSTOLIC BLOOD PRESSURE: 130 MMHG

## 2020-09-27 VITALS — DIASTOLIC BLOOD PRESSURE: 41 MMHG | SYSTOLIC BLOOD PRESSURE: 129 MMHG

## 2020-09-27 VITALS — SYSTOLIC BLOOD PRESSURE: 129 MMHG | DIASTOLIC BLOOD PRESSURE: 31 MMHG

## 2020-09-27 VITALS — SYSTOLIC BLOOD PRESSURE: 119 MMHG | DIASTOLIC BLOOD PRESSURE: 42 MMHG

## 2020-09-27 VITALS — DIASTOLIC BLOOD PRESSURE: 38 MMHG | SYSTOLIC BLOOD PRESSURE: 126 MMHG

## 2020-09-27 VITALS — DIASTOLIC BLOOD PRESSURE: 37 MMHG | SYSTOLIC BLOOD PRESSURE: 122 MMHG

## 2020-09-27 VITALS — DIASTOLIC BLOOD PRESSURE: 38 MMHG | SYSTOLIC BLOOD PRESSURE: 117 MMHG

## 2020-09-27 VITALS — DIASTOLIC BLOOD PRESSURE: 51 MMHG | SYSTOLIC BLOOD PRESSURE: 124 MMHG

## 2020-09-27 VITALS — SYSTOLIC BLOOD PRESSURE: 130 MMHG | DIASTOLIC BLOOD PRESSURE: 29 MMHG

## 2020-09-27 VITALS — DIASTOLIC BLOOD PRESSURE: 38 MMHG | SYSTOLIC BLOOD PRESSURE: 121 MMHG

## 2020-09-27 VITALS — DIASTOLIC BLOOD PRESSURE: 35 MMHG | SYSTOLIC BLOOD PRESSURE: 122 MMHG

## 2020-09-27 VITALS — SYSTOLIC BLOOD PRESSURE: 128 MMHG | DIASTOLIC BLOOD PRESSURE: 33 MMHG

## 2020-09-27 VITALS — DIASTOLIC BLOOD PRESSURE: 33 MMHG | SYSTOLIC BLOOD PRESSURE: 132 MMHG

## 2020-09-27 VITALS — DIASTOLIC BLOOD PRESSURE: 33 MMHG | SYSTOLIC BLOOD PRESSURE: 126 MMHG

## 2020-09-27 VITALS — SYSTOLIC BLOOD PRESSURE: 126 MMHG | DIASTOLIC BLOOD PRESSURE: 32 MMHG

## 2020-09-27 VITALS — DIASTOLIC BLOOD PRESSURE: 39 MMHG | SYSTOLIC BLOOD PRESSURE: 132 MMHG

## 2020-09-27 VITALS — SYSTOLIC BLOOD PRESSURE: 126 MMHG | DIASTOLIC BLOOD PRESSURE: 39 MMHG

## 2020-09-27 VITALS — SYSTOLIC BLOOD PRESSURE: 117 MMHG | DIASTOLIC BLOOD PRESSURE: 39 MMHG

## 2020-09-27 VITALS — SYSTOLIC BLOOD PRESSURE: 126 MMHG | DIASTOLIC BLOOD PRESSURE: 35 MMHG

## 2020-09-27 VITALS — DIASTOLIC BLOOD PRESSURE: 42 MMHG | SYSTOLIC BLOOD PRESSURE: 121 MMHG

## 2020-09-27 VITALS — DIASTOLIC BLOOD PRESSURE: 34 MMHG | SYSTOLIC BLOOD PRESSURE: 129 MMHG

## 2020-09-27 LAB
ADD MANUAL DIFF: YES
ALBUMIN SERPL-MCNC: 1.4 G/DL (ref 3.4–5)
ALBUMIN/GLOB SERPL: 0.3 {RATIO} (ref 1–2.7)
ALP SERPL-CCNC: 103 U/L (ref 46–116)
ALT SERPL-CCNC: 24 U/L (ref 12–78)
ANION GAP SERPL CALC-SCNC: 7 MMOL/L (ref 5–15)
AST SERPL-CCNC: 38 U/L (ref 15–37)
BILIRUB SERPL-MCNC: 0.7 MG/DL (ref 0.2–1)
BUN SERPL-MCNC: 5 MG/DL (ref 7–18)
CALCIUM SERPL-MCNC: 7.9 MG/DL (ref 8.5–10.1)
CHLORIDE SERPL-SCNC: 103 MMOL/L (ref 98–107)
CO2 SERPL-SCNC: 26 MMOL/L (ref 21–32)
CREAT SERPL-MCNC: 0.4 MG/DL (ref 0.55–1.3)
ERYTHROCYTE [DISTWIDTH] IN BLOOD BY AUTOMATED COUNT: 17.1 % (ref 11.6–14.8)
GLOBULIN SER-MCNC: 4.2 G/DL
HCT VFR BLD CALC: 22.4 % (ref 42–52)
HGB BLD-MCNC: 7.5 G/DL (ref 14.2–18)
MCV RBC AUTO: 79 FL (ref 80–99)
PLATELET # BLD: 181 K/UL (ref 150–450)
POTASSIUM SERPL-SCNC: 3.1 MMOL/L (ref 3.5–5.1)
RBC # BLD AUTO: 2.82 M/UL (ref 4.7–6.1)
SODIUM SERPL-SCNC: 136 MMOL/L (ref 136–145)
WBC # BLD AUTO: 12 K/UL (ref 4.8–10.8)

## 2020-09-27 RX ADMIN — SODIUM CHLORIDE SCH MLS/HR: 900 INJECTION, SOLUTION INTRAVENOUS at 11:36

## 2020-09-27 RX ADMIN — ZINC SULFATE CAP 220 MG (50 MG ELEMENTAL ZN) SCH MG: 220 (50 ZN) CAP at 10:00

## 2020-09-27 RX ADMIN — HEPARIN SODIUM SCH UNITS: 5000 INJECTION INTRAVENOUS; SUBCUTANEOUS at 20:32

## 2020-09-27 RX ADMIN — Medication SCH MG: at 09:59

## 2020-09-27 RX ADMIN — Medication SCH MG: at 17:00

## 2020-09-27 RX ADMIN — CHLORHEXIDINE GLUCONATE SCH APPLIC: 213 SOLUTION TOPICAL at 20:29

## 2020-09-27 RX ADMIN — MEROPENEM SCH MLS/HR: 1 INJECTION INTRAVENOUS at 14:27

## 2020-09-27 RX ADMIN — LEVETIRACETAM SCH MG: 100 SOLUTION ORAL at 20:29

## 2020-09-27 RX ADMIN — MEROPENEM SCH MLS/HR: 1 INJECTION INTRAVENOUS at 05:30

## 2020-09-27 RX ADMIN — SODIUM CHLORIDE AND POTASSIUM CHLORIDE SCH MLS/HR: 9; 1.49 INJECTION, SOLUTION INTRAVENOUS at 16:57

## 2020-09-27 RX ADMIN — SODIUM CHLORIDE AND POTASSIUM CHLORIDE SCH MLS/HR: 9; 1.49 INJECTION, SOLUTION INTRAVENOUS at 05:30

## 2020-09-27 RX ADMIN — MEROPENEM SCH MLS/HR: 1 INJECTION INTRAVENOUS at 22:05

## 2020-09-27 RX ADMIN — LEVETIRACETAM SCH MG: 100 SOLUTION ORAL at 09:59

## 2020-09-27 RX ADMIN — HEPARIN SODIUM SCH UNITS: 5000 INJECTION INTRAVENOUS; SUBCUTANEOUS at 10:01

## 2020-09-27 NOTE — INFECTIOUS DISEASES PROG NOTE
Assessment/Plan


Assessment/Plan


A;


1. Serratia sepsis,


2. E. coli urinary tract infection.


3. Seizures.


4. Respiratory failure, status post tracheostomy.


5. leukocytosis.


6. Septic shock.


7. Anemia


8. Diarrhea


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








PLAN:


1. Continue meropenem.


2. Left arm catheter was removed.


3. We will follow up cultures





Subjective


ROS Limited/Unobtainable:  Yes


Constitutional:  Denies: fever


Gastrointestinal/Abdominal:  Reports: diarrhea


Allergies:  


Coded Allergies:  


     No Known Allergies (Unverified , 9/24/20)





Objective





Last 24 Hour Vital Signs








  Date Time  Temp Pulse Resp B/P (MAP) Pulse Ox O2 Delivery O2 Flow Rate FiO2


 


9/27/20 09:00    118/42    


 


9/27/20 08:00  88 17 128/33 (64) 100   


 


9/27/20 08:00    116/46    


 


9/27/20 08:00  90      


 


9/27/20 07:12  85 23     40


 


9/27/20 07:00  90 22 122/37 (65) 100   


 


9/27/20 07:00    122/37    


 


9/27/20 06:00    126/33    


 


9/27/20 06:00  85 20 126/33 (64) 100   


 


9/27/20 05:00  88 21 124/36 (65) 100   


 


9/27/20 05:00    124/36    


 


9/27/20 04:00 98.3 86 21 126/38 (67) 100   


 


9/27/20 04:00  86      


 


9/27/20 04:00    126/38    


 


9/27/20 04:00        40


 


9/27/20 04:00      Mechanical Ventilator  


 


9/27/20 03:30  90 21     40


 


9/27/20 03:00  87 22 126/36 (66) 100   


 


9/27/20 03:00    126/36    


 


9/27/20 02:00  85 20 127/44 (71) 100   


 


9/27/20 02:00    127/44    


 


9/27/20 01:00  84 20 126/39 (68) 100   


 


9/27/20 01:00    126/39    


 


9/27/20 00:00  88      


 


9/27/20 00:00 98.1 88 22 121/38 (65) 100   


 


9/27/20 00:00      Mechanical Ventilator  


 


9/27/20 00:00    121/38    


 


9/27/20 00:00        40


 


9/26/20 23:47  90      


 


9/26/20 23:14  82 20     40


 


9/26/20 23:00  86 20 123/37 (65) 100   


 


9/26/20 23:00    123/37    


 


9/26/20 22:00    125/38    


 


9/26/20 22:00  88 21 125/38 (67) 100   


 


9/26/20 21:00  88 20 123/40 (67) 100   


 


9/26/20 21:00    123/40    


 


9/26/20 20:00        40


 


9/26/20 20:00    124/30    


 


9/26/20 20:00      Mechanical Ventilator  


 


9/26/20 20:00 98.1 93 26 124/30 (61) 100   


 


9/26/20 19:58  91 28     40


 


9/26/20 19:35  93      


 


9/26/20 19:00  96 23 125/33 (63) 100   


 


9/26/20 19:00    125/33    


 


9/26/20 18:00    127/49    


 


9/26/20 18:00  88 22 128/37 (67) 100   


 


9/26/20 17:29    127/57    


 


9/26/20 17:00  87 23 127/57 (80) 100   


 


9/26/20 16:00  90      


 


9/26/20 16:00      Mechanical Ventilator  


 


9/26/20 16:00 98.7 90 25 125/34 (64) 100   


 


9/26/20 16:00        40


 


9/26/20 16:00    125/37    


 


9/26/20 15:30  89 20 121/38 (65) 100   


 


9/26/20 15:08  85 21     40


 


9/26/20 15:00  90 24 123/36 (65) 100   


 


9/26/20 15:00    126/37    


 


9/26/20 14:30  91 21 126/38 (67) 100   


 


9/26/20 14:00  91 23 124/32 (62) 100   


 


9/26/20 14:00    122/40    


 


9/26/20 13:00  93 23 120/36 (64) 100   


 


9/26/20 13:00    120/33    


 


9/26/20 12:00        40


 


9/26/20 12:00      Mechanical Ventilator  


 


9/26/20 12:00    112/40    


 


9/26/20 12:00  93      


 


9/26/20 12:00 98.5 96 27 118/32 (60) 100   


 


9/26/20 11:14  97 24     40


 


9/26/20 11:00  94 22 114/33 (60) 100   


 


9/26/20 11:00    114/30    








Height (Feet):  5


Height (Inches):  10.00


Weight (Pounds):  200


HEENT:  status post trach


Respiratory/Chest:  lungs clear, other - on ventilator


Cardiovascular:  normal rate, other - R arm PICC line


Abdomen:  soft, non tender, other - GT & rectal tube


Extremities:  other - generalized edema


Neurologic/Psychiatric:  alert, responsive





Microbiology








 Date/Time


Source Procedure


Growth Status





 


 9/25/20 21:45


Stool Clostridium difficile Toxin Assay - Final Complete


 


 9/25/20 06:00


Rectum VRE Culture - Final


Enterococcus Faecium - Vre Complete


 


 9/25/20 06:00


Rectum - Final


NO CARBAPENEM-RESISTANT ENTEROBACTERI... Complete


 


 9/25/20 06:00


Nasal Nares MRSA Culture - Final


NO METHICILLIN RESISTANT STAPH AUREUS... Complete


 


 9/24/20 16:30


Urine,Clean Catch Urine Culture - Final


Escherichia Coli - Esbl Complete


 


 9/24/20 16:30


Blood Blood Culture - Final


Serratia Marcescens Complete


 


 9/24/20 16:15


Nasopharynx SARS-CoV-2 RdRp Gene Assay - Final Complete


 


 9/24/20 16:15


Blood Blood Culture - Final


Serratia Marcescens Complete











Laboratory Tests








Test


 9/26/20


18:30 9/27/20


04:25


 


White Blood Count


 14.5 K/UL


(4.8-10.8)  H 





 


Red Blood Count


 2.86 M/UL


(4.70-6.10)  L 





 


Hemoglobin


 7.7 G/DL


(14.2-18.0)  L 





 


Hematocrit


 23.4 %


(42.0-52.0)  L 





 


Mean Corpuscular Volume 82 FL (80-99)   


 


Mean Corpuscular Hemoglobin


 27.0 PG


(27.0-31.0) 





 


Mean Corpuscular Hemoglobin


Concent 33.0 G/DL


(32.0-36.0) 





 


Red Cell Distribution Width


 18.2 %


(11.6-14.8)  H 





 


Platelet Count


 182 K/UL


(150-450) 





 


Mean Platelet Volume


 8.4 FL


(6.5-10.1) 





 


Neutrophils (%) (Auto)


 % (45.0-75.0)


 





 


Lymphocytes (%) (Auto)


 % (20.0-45.0)


 





 


Monocytes (%) (Auto)  % (1.0-10.0)   


 


Eosinophils (%) (Auto)  % (0.0-3.0)   


 


Basophils (%) (Auto)  % (0.0-2.0)   


 


Differential Total Cells


Counted 100  


 





 


Neutrophils % (Manual) 77 % (45-75)  H 


 


Lymphocytes % (Manual) 15 % (20-45)  L 


 


Monocytes % (Manual) 8 % (1-10)   


 


Eosinophils % (Manual) 0 % (0-3)   


 


Basophils % (Manual) 0 % (0-2)   


 


Band Neutrophils 0 % (0-8)   


 


Platelet Estimate Adequate   


 


Platelet Morphology Normal   


 


Hypochromasia 1+   


 


Anisocytosis 2+   


 


Stool Occult Blood Pending   


 


Sodium Level


 126 MMOL/L


(136-145)  L 136 MMOL/L


(136-145)  #


 


Potassium Level


 3.1 MMOL/L


(3.5-5.1)  L 3.1 MMOL/L


(3.5-5.1)  L


 


Chloride Level


 97 MMOL/L


()  L 103 MMOL/L


()


 


Carbon Dioxide Level


 25 MMOL/L


(21-32) 26 MMOL/L


(21-32)


 


Anion Gap


 4 mmol/L


(5-15)  L 7 mmol/L


(5-15)


 


Blood Urea Nitrogen


 7 mg/dL (7-18)


 5 mg/dL (7-18)


L


 


Creatinine


 0.5 MG/DL


(0.55-1.30)  L 0.4 MG/DL


(0.55-1.30)  L


 


Estimat Glomerular Filtration


Rate > 60 mL/min


(>60) > 60 mL/min


(>60)


 


Glucose Level


 149 MG/DL


()  H 97 MG/DL


()


 


Calcium Level


 8.0 MG/DL


(8.5-10.1)  L 7.9 MG/DL


(8.5-10.1)  L


 


Iron Level


 10 ug/dL


()  L 





 


Total Iron Binding Capacity


 100 ug/dL


(250-450)  L 





 


Percent Iron Saturation 10 % (15-50)  L 


 


Unsaturated Iron Binding


 90 ug/dL


(112-346)  L 





 


Total Bilirubin


 0.7 MG/DL


(0.2-1.0) 0.7 MG/DL


(0.2-1.0)


 


Aspartate Amino Transf


(AST/SGOT) 39 U/L (15-37)


H 38 U/L (15-37)


H


 


Alanine Aminotransferase


(ALT/SGPT) 23 U/L (12-78)


 24 U/L (12-78)





 


Alkaline Phosphatase


 112 U/L


() 103 U/L


()


 


Total Protein


 5.7 G/DL


(6.4-8.2)  L 5.6 G/DL


(6.4-8.2)  L


 


Albumin


 1.4 G/DL


(3.4-5.0)  L 1.4 G/DL


(3.4-5.0)  L


 


Globulin 4.3 g/dL   4.2 g/dL  


 


Albumin/Globulin Ratio


 0.3 (1.0-2.7)


L 0.3 (1.0-2.7)


L


 


Magnesium Level


 


 2.6 MG/DL


(1.8-2.4)  H


 


Thyroid Stimulating Hormone


(TSH) 


 1.568 uiU/mL


(0.358-3.740)











Current Medications








 Medications


  (Trade)  Dose


 Ordered  Sig/Tony


 Route


 PRN Reason  Start Time


 Stop Time Status Last Admin


Dose Admin


 


 Acetaminophen


  (Tylenol)  650 mg  Q6H  PRN


 GT


 Mild Pain (Pain Scale 1-3)  9/24/20 18:45


 10/24/20 18:44  9/24/20 21:39





 


 Acetaminophen


  (Tylenol)  650 mg  Q6H  PRN


 GT


 Temp >100.5  9/24/20 19:15


 10/24/20 19:14  9/25/20 17:13





 


 Ascorbic Acid


  (Vitamin C)  500 mg  TWICE A  DAY


 GT


   9/25/20 09:00


 10/25/20 08:59  9/27/20 09:59





 


 Bisacodyl


  (Dulcolax)  10 mg  DAILYPRN  PRN


 RECTAL


 Constipation  9/24/20 18:45


 12/23/20 18:44   





 


 Chlorhexidine


 Gluconate


  (Holli-Hex 2%)  1 applic  DAILY@2000


 TOPIC


   9/25/20 20:00


 12/24/20 19:59  9/26/20 19:38





 


 Famotidine


  (Pepcid)  20 mg  TWICE A  DAY


 GT


   9/24/20 18:45


 12/23/20 18:44  9/27/20 09:59





 


 Heparin Sodium


  (Porcine)


  (Heparin 5000


 units/ml)  5,000 units  EVERY 12  HOURS


 SUBQ


   9/24/20 21:00


 11/8/20 20:59  9/27/20 10:01





 


 Levetiracetam


  (Keppra)  1,000 mg  EVERY 12  HOURS


 GT


   9/24/20 21:00


 10/24/20 20:59  9/27/20 09:59





 


 Meropenem 1 gm/


 Sodium Chloride  55 ml @ 


 110 mls/hr  Q8HR


 IVPB


   9/26/20 14:00


 10/1/20 13:59  9/27/20 05:30





 


 Norepinephrine


 Bitartrate 8 mg/


 Dextrose  500 ml @ 0


 mls/hr  Q24H


 IV


   9/24/20 22:30


 9/27/20 22:29  9/26/20 17:29





 


 Potassium


 Chloride/Sodium


 Chloride  1,000 ml @ 


 100 mls/hr  Q10H


 IV


   9/26/20 00:30


 10/26/20 00:29  9/27/20 05:30





 


 Zinc Sulfate


  (Zinc Sulfate)  220 mg  DAILY


 GT


   9/24/20 21:00


 12/23/20 20:59  9/27/20 10:00




















Harry Snow MD               Sep 27, 2020 10:38

## 2020-09-27 NOTE — GENERAL PROGRESS NOTE
Subjective


ROS Limited/Unobtainable:  Yes


Constitutional:  Reports: malaise, weakness


HEENT:  Reports: no symptoms


Cardiovascular:  Reports: no symptoms


Respiratory:  Reports: shortness of breath


Gastrointestinal/Abdominal:  Reports: difficulty swallowing


Genitourinary:  Reports: no symptoms


Neurologic/Psychiatric:  Reports: pre-existing deficit, seizure


Endocrine:  Reports: no symptoms


Hematologic/Lymphatic:  Reports: anemia


Allergies:  


Coded Allergies:  


     No Known Allergies (Unverified , 9/24/20)


All Systems:  reviewed and negative except above


Subjective


no events. decreased pressor requirements. +blood and urine cultures. on iv abx.

no reports or bleeding. awake but nonverbal.





Objective





Last 24 Hour Vital Signs








  Date Time  Temp Pulse Resp B/P (MAP) Pulse Ox O2 Delivery O2 Flow Rate FiO2


 


9/27/20 11:12  85 21     40


 


9/27/20 10:30  87 23 132/33 (66) 100   


 


9/27/20 10:00  84 21 124/40 (68) 100   


 


9/27/20 10:00    132/33    


 


9/27/20 09:30  84 23 119/42 (67) 100   


 


9/27/20 09:00    118/42    


 


9/27/20 09:00 98.8 86 22 126/32 (63) 100   


 


9/27/20 08:00      Mechanical Ventilator  


 


9/27/20 08:00  88 17 128/33 (64) 100   


 


9/27/20 08:00        40


 


9/27/20 08:00    116/46    


 


9/27/20 08:00  90      


 


9/27/20 07:12  85 23     40


 


9/27/20 07:00  90 22 122/37 (65) 100   


 


9/27/20 07:00    122/37    


 


9/27/20 06:00    126/33    


 


9/27/20 06:00  85 20 126/33 (64) 100   


 


9/27/20 05:00  88 21 124/36 (65) 100   


 


9/27/20 05:00    124/36    


 


9/27/20 04:00 98.3 86 21 126/38 (67) 100   


 


9/27/20 04:00  86      


 


9/27/20 04:00    126/38    


 


9/27/20 04:00        40


 


9/27/20 04:00      Mechanical Ventilator  


 


9/27/20 03:30  90 21     40


 


9/27/20 03:00  87 22 126/36 (66) 100   


 


9/27/20 03:00    126/36    


 


9/27/20 02:00  85 20 127/44 (71) 100   


 


9/27/20 02:00    127/44    


 


9/27/20 01:00  84 20 126/39 (68) 100   


 


9/27/20 01:00    126/39    


 


9/27/20 00:00  88      


 


9/27/20 00:00 98.1 88 22 121/38 (65) 100   


 


9/27/20 00:00      Mechanical Ventilator  


 


9/27/20 00:00    121/38    


 


9/27/20 00:00        40


 


9/26/20 23:47  90      


 


9/26/20 23:14  82 20     40


 


9/26/20 23:00  86 20 123/37 (65) 100   


 


9/26/20 23:00    123/37    


 


9/26/20 22:00    125/38    


 


9/26/20 22:00  88 21 125/38 (67) 100   


 


9/26/20 21:00  88 20 123/40 (67) 100   


 


9/26/20 21:00    123/40    


 


9/26/20 20:00        40


 


9/26/20 20:00    124/30    


 


9/26/20 20:00      Mechanical Ventilator  


 


9/26/20 20:00 98.1 93 26 124/30 (61) 100   


 


9/26/20 19:58  91 28     40


 


9/26/20 19:35  93      


 


9/26/20 19:00  96 23 125/33 (63) 100   


 


9/26/20 19:00    125/33    


 


9/26/20 18:00    127/49    


 


9/26/20 18:00  88 22 128/37 (67) 100   


 


9/26/20 17:29    127/57    


 


9/26/20 17:00  87 23 127/57 (80) 100   


 


9/26/20 16:00  90      


 


9/26/20 16:00      Mechanical Ventilator  


 


9/26/20 16:00 98.7 90 25 125/34 (64) 100   


 


9/26/20 16:00        40


 


9/26/20 16:00    125/37    


 


9/26/20 15:30  89 20 121/38 (65) 100   


 


9/26/20 15:08  85 21     40


 


9/26/20 15:00  90 24 123/36 (65) 100   


 


9/26/20 15:00    126/37    


 


9/26/20 14:30  91 21 126/38 (67) 100   


 


9/26/20 14:00  91 23 124/32 (62) 100   


 


9/26/20 14:00    122/40    


 


9/26/20 13:00  93 23 120/36 (64) 100   


 


9/26/20 13:00    120/33    


 


9/26/20 12:00        40


 


9/26/20 12:00      Mechanical Ventilator  


 


9/26/20 12:00    112/40    


 


9/26/20 12:00  93      


 


9/26/20 12:00 98.5 96 27 118/32 (60) 100   

















Intake and Output  


 


 9/26/20 9/27/20





 19:00 07:00


 


Intake Total 1667.5 ml 2090.0 ml


 


Output Total 1070 ml 1220 ml


 


Balance 597.5 ml 870.0 ml


 


  


 


IV Total 1547.5 ml 1980.0 ml


 


Other 120 ml 110 ml


 


Output Urine Total 770 ml 770 ml


 


Stool Total 300 ml 450 ml








Laboratory Tests


9/26/20 18:30: 


White Blood Count 14.5H, Red Blood Count 2.86L, Hemoglobin 7.7L, Hematocrit 

23.4L, Mean Corpuscular Volume 82, Mean Corpuscular Hemoglobin 27.0, Mean 

Corpuscular Hemoglobin Concent 33.0, Red Cell Distribution Width 18.2H, Platelet

Count 182, Mean Platelet Volume 8.4, Neutrophils (%) (Auto) , Lymphocytes (%) 

(Auto) , Monocytes (%) (Auto) , Eosinophils (%) (Auto) , Basophils (%) (Auto) , 

Differential Total Cells Counted 100, Neutrophils % (Manual) 77H, Lymphocytes % 

(Manual) 15L, Monocytes % (Manual) 8, Eosinophils % (Manual) 0, Basophils % 

(Manual) 0, Band Neutrophils 0, Platelet Estimate Adequate, Platelet Morphology 

Normal, Hypochromasia 1+, Anisocytosis 2+, Stool Occult Blood [Pending], Sodium 

Level 126L, Potassium Level 3.1L, Chloride Level 97L, Carbon Dioxide Level 25, 

Anion Gap 4L, Blood Urea Nitrogen 7, Creatinine 0.5L, Estimat Glomerular 

Filtration Rate > 60, Glucose Level 149H, Calcium Level 8.0L, Iron Level 10L, 

Total Iron Binding Capacity 100L, Percent Iron Saturation 10L, Unsaturated Iron 

Binding 90L, Total Bilirubin 0.7, Aspartate Amino Transf (AST/SGOT) 39H, Alanine

Aminotransferase (ALT/SGPT) 23, Alkaline Phosphatase 112, Total Protein 5.7L, Al

bumin 1.4L, Globulin 4.3, Albumin/Globulin Ratio 0.3L


9/27/20 04:25: 


Sodium Level 136#, Potassium Level 3.1L, Chloride Level 103, Carbon Dioxide 

Level 26, Anion Gap 7, Blood Urea Nitrogen 5L, Creatinine 0.4L, Estimat 

Glomerular Filtration Rate > 60, Glucose Level 97, Calcium Level 7.9L, Total 

Bilirubin 0.7, Aspartate Amino Transf (AST/SGOT) 38H, Alanine Aminotransferase 

(ALT/SGPT) 24, Alkaline Phosphatase 103, Total Protein 5.6L, Albumin 1.4L, 

Globulin 4.2, Albumin/Globulin Ratio 0.3L, Magnesium Level 2.6H, Thyroid 

Stimulating Hormone (TSH) 1.568


Height (Feet):  5


Height (Inches):  10.00


Weight (Pounds):  200


Objective


General Appearance:  WD/WN, lethargic, confused


EENT:  normal ENT inspection


Neck:  non-tender, normal alignment


Cardiovascular:  normal peripheral pulses, normal rate, regular rhythm


Respiratory/Chest:  chest wall non-tender, lungs clear, normal breath sounds


Abdomen:  normal bowel sounds, non tender, soft, no organomegaly


Edema:  no edema noted Arm (L), no edema noted Arm (R)


Neurologic:  disoriented, unresponsive





Assessment/Plan


Problem List:  


(1) Pneumonia


ICD Codes:  J18.9 - Pneumonia, unspecified organism


SNOMED:  706367642


(2) Rapid atrial fibrillation


ICD Codes:  I48.91 - Unspecified atrial fibrillation


SNOMED:  248455372


(3) Sepsis


ICD Codes:  A41.9 - Sepsis, unspecified organism


SNOMED:  67173719


Status:  stable


Assessment/Plan:


cont pressors


wean as able


iv abx


follow up cultures


transfuse as needed


monitor for bleeding


replace lytes


PPI rx


check stool ob


check iron panel


replace lytes


cards and ID appreciated


critical and guarded











Brijesh Dewey MD              Sep 27, 2020 11:31

## 2020-09-27 NOTE — CARDIOLOGY PROGRESS NOTE
Subjective


DATE OF SERVICE:  Sep 27, 2020


Patient remains in ICU


Still on pressor support for hypotension; still with tapering efforts.


Low hemoglobin, K+, and Mg++ noted yesterday; repletion on-going.





Objective





Last 24 Hour Vital Signs








  Date Time  Temp Pulse Resp B/P (MAP) Pulse Ox O2 Delivery O2 Flow Rate FiO2


 


9/27/20 16:00  85 24 126/35 (65) 100   


 


9/27/20 16:00        40


 


9/27/20 16:00      Mechanical Ventilator  


 


9/27/20 16:00  84      


 


9/27/20 16:00    127/33    


 


9/27/20 15:08  88 26     40


 


9/27/20 15:00  86 21 117/38 (64) 100   


 


9/27/20 15:00    117/38    


 


9/27/20 14:30  82 21 129/34 (65) 100   


 


9/27/20 14:00    125/37    


 


9/27/20 14:00  81 26 125/37 (66) 100   


 


9/27/20 13:00  86 26 129/31 (63) 100   


 


9/27/20 13:00    129/31    


 


9/27/20 12:30  84 19 121/42 (68) 100   


 


9/27/20 12:06  84      


 


9/27/20 12:00        40


 


9/27/20 12:00      Mechanical Ventilator  


 


9/27/20 12:00    130/29    


 


9/27/20 12:00 98.6 86 24 130/29 (62) 100   


 


9/27/20 11:36    132/33    


 


9/27/20 11:30  85 24 130/29 (62) 100   


 


9/27/20 11:12  85 21     40


 


9/27/20 11:00  86 24 132/39 (70) 100   


 


9/27/20 11:00    124/35    


 


9/27/20 10:30  87 23 132/33 (66) 100   


 


9/27/20 10:00  84 21 124/40 (68) 100   


 


9/27/20 10:00    132/33    


 


9/27/20 09:30  84 23 119/42 (67) 100   


 


9/27/20 09:00    118/42    


 


9/27/20 09:00 98.8 86 22 126/32 (63) 100   


 


9/27/20 08:00      Mechanical Ventilator  


 


9/27/20 08:00  88 17 128/33 (64) 100   


 


9/27/20 08:00        40


 


9/27/20 08:00    116/46    


 


9/27/20 08:00  90      


 


9/27/20 07:12  85 23     40


 


9/27/20 07:00  90 22 122/37 (65) 100   


 


9/27/20 07:00    122/37    


 


9/27/20 06:00    126/33    


 


9/27/20 06:00  85 20 126/33 (64) 100   


 


9/27/20 05:00  88 21 124/36 (65) 100   


 


9/27/20 05:00    124/36    


 


9/27/20 04:00 98.3 86 21 126/38 (67) 100   


 


9/27/20 04:00  86      


 


9/27/20 04:00    126/38    


 


9/27/20 04:00        40


 


9/27/20 04:00      Mechanical Ventilator  


 


9/27/20 03:30  90 21     40


 


9/27/20 03:00  87 22 126/36 (66) 100   


 


9/27/20 03:00    126/36    


 


9/27/20 02:00  85 20 127/44 (71) 100   


 


9/27/20 02:00    127/44    


 


9/27/20 01:00  84 20 126/39 (68) 100   


 


9/27/20 01:00    126/39    


 


9/27/20 00:00  88      


 


9/27/20 00:00 98.1 88 22 121/38 (65) 100   


 


9/27/20 00:00      Mechanical Ventilator  


 


9/27/20 00:00    121/38    


 


9/27/20 00:00        40


 


9/26/20 23:47  90      


 


9/26/20 23:14  82 20     40


 


9/26/20 23:00  86 20 123/37 (65) 100   


 


9/26/20 23:00    123/37    


 


9/26/20 22:00    125/38    


 


9/26/20 22:00  88 21 125/38 (67) 100   


 


9/26/20 21:00  88 20 123/40 (67) 100   


 


9/26/20 21:00    123/40    


 


9/26/20 20:00        40


 


9/26/20 20:00    124/30    


 


9/26/20 20:00      Mechanical Ventilator  


 


9/26/20 20:00 98.1 93 26 124/30 (61) 100   


 


9/26/20 19:58  91 28     40


 


9/26/20 19:35  93      


 


9/26/20 19:00  96 23 125/33 (63) 100   


 


9/26/20 19:00    125/33    


 


9/26/20 18:00    127/49    


 


9/26/20 18:00  88 22 128/37 (67) 100   


 


9/26/20 17:29    127/57    








ROS: unchanged from my note of 9/24/20.


HEENT:  Thin Trach secretions


RHYTHM:  Afib


LUNGS:  bilateral rhonchi


CARDIAC:  irregularly irregular


ABDOMEN:  non tender, soft, distended, G-Tube intact


EXTREMITIES:  trace edema, other - Neuro: comatose state





Laboratory Tests








Test


 9/26/20


18:30 9/27/20


04:25 9/27/20


11:20


 


White Blood Count


 14.5 K/UL


(4.8-10.8)  H 


 12.0 K/UL


(4.8-10.8)  H


 


Red Blood Count


 2.86 M/UL


(4.70-6.10)  L 


 2.82 M/UL


(4.70-6.10)  L


 


Hemoglobin


 7.7 G/DL


(14.2-18.0)  L 


 7.5 G/DL


(14.2-18.0)  L


 


Hematocrit


 23.4 %


(42.0-52.0)  L 


 22.4 %


(42.0-52.0)  L


 


Mean Corpuscular Volume


 82 FL (80-99)  


 


 79 FL (80-99)


L


 


Mean Corpuscular Hemoglobin


 27.0 PG


(27.0-31.0) 


 26.5 PG


(27.0-31.0)  L


 


Mean Corpuscular Hemoglobin


Concent 33.0 G/DL


(32.0-36.0) 


 33.5 G/DL


(32.0-36.0)


 


Red Cell Distribution Width


 18.2 %


(11.6-14.8)  H 


 17.1 %


(11.6-14.8)  H


 


Platelet Count


 182 K/UL


(150-450) 


 181 K/UL


(150-450)


 


Mean Platelet Volume


 8.4 FL


(6.5-10.1) 


 7.3 FL


(6.5-10.1)


 


Neutrophils (%) (Auto)


 % (45.0-75.0)


 


 % (45.0-75.0)





 


Lymphocytes (%) (Auto)


 % (20.0-45.0)


 


 % (20.0-45.0)





 


Monocytes (%) (Auto)  % (1.0-10.0)     % (1.0-10.0)  


 


Eosinophils (%) (Auto)  % (0.0-3.0)     % (0.0-3.0)  


 


Basophils (%) (Auto)  % (0.0-2.0)     % (0.0-2.0)  


 


Differential Total Cells


Counted 100  


 


 100  





 


Neutrophils % (Manual) 77 % (45-75)  H  76 % (45-75)  H


 


Lymphocytes % (Manual) 15 % (20-45)  L  20 % (20-45)  


 


Monocytes % (Manual) 8 % (1-10)    4 % (1-10)  


 


Eosinophils % (Manual) 0 % (0-3)    0 % (0-3)  


 


Basophils % (Manual) 0 % (0-2)    0 % (0-2)  


 


Band Neutrophils 0 % (0-8)    0 % (0-8)  


 


Platelet Estimate Adequate    Adequate  


 


Platelet Morphology Normal    Normal  


 


Hypochromasia 1+    3+  


 


Anisocytosis 2+    1+  


 


Stool Occult Blood Pending    


 


Sodium Level


 126 MMOL/L


(136-145)  L 136 MMOL/L


(136-145)  # 





 


Potassium Level


 3.1 MMOL/L


(3.5-5.1)  L 3.1 MMOL/L


(3.5-5.1)  L 





 


Chloride Level


 97 MMOL/L


()  L 103 MMOL/L


() 





 


Carbon Dioxide Level


 25 MMOL/L


(21-32) 26 MMOL/L


(21-32) 





 


Anion Gap


 4 mmol/L


(5-15)  L 7 mmol/L


(5-15) 





 


Blood Urea Nitrogen


 7 mg/dL (7-18)


 5 mg/dL (7-18)


L 





 


Creatinine


 0.5 MG/DL


(0.55-1.30)  L 0.4 MG/DL


(0.55-1.30)  L 





 


Estimat Glomerular Filtration


Rate > 60 mL/min


(>60) > 60 mL/min


(>60) 





 


Glucose Level


 149 MG/DL


()  H 97 MG/DL


() 





 


Calcium Level


 8.0 MG/DL


(8.5-10.1)  L 7.9 MG/DL


(8.5-10.1)  L 





 


Iron Level


 10 ug/dL


()  L 


 





 


Total Iron Binding Capacity


 100 ug/dL


(250-450)  L 


 





 


Percent Iron Saturation 10 % (15-50)  L  


 


Unsaturated Iron Binding


 90 ug/dL


(112-346)  L 


 





 


Total Bilirubin


 0.7 MG/DL


(0.2-1.0) 0.7 MG/DL


(0.2-1.0) 





 


Aspartate Amino Transf


(AST/SGOT) 39 U/L (15-37)


H 38 U/L (15-37)


H 





 


Alanine Aminotransferase


(ALT/SGPT) 23 U/L (12-78)


 24 U/L (12-78)


 





 


Alkaline Phosphatase


 112 U/L


() 103 U/L


() 





 


Total Protein


 5.7 G/DL


(6.4-8.2)  L 5.6 G/DL


(6.4-8.2)  L 





 


Albumin


 1.4 G/DL


(3.4-5.0)  L 1.4 G/DL


(3.4-5.0)  L 





 


Globulin 4.3 g/dL   4.2 g/dL   


 


Albumin/Globulin Ratio


 0.3 (1.0-2.7)


L 0.3 (1.0-2.7)


L 





 


Magnesium Level


 


 2.6 MG/DL


(1.8-2.4)  H 





 


Thyroid Stimulating Hormone


(TSH) 


 1.568 uiU/mL


(0.358-3.740) 





 


Microcytosis   1+  


 


Spherocytes   2+  











Microbiology








 Date/Time


Source Procedure


Growth Status





 


 9/25/20 21:45


Stool Clostridium difficile Toxin Assay - Final Complete


 


 9/25/20 06:00


Rectum VRE Culture - Final


Enterococcus Faecium - Vre Complete


 


 9/25/20 06:00


Rectum - Final


NO CARBAPENEM-RESISTANT ENTEROBACTERI... Complete


 


 9/25/20 06:00


Nasal Nares MRSA Culture - Final


NO METHICILLIN RESISTANT STAPH AUREUS... Complete











Assessment/Plan


Assessment/Plan


Sepsis with shock


Respiratory failure


Chronic vegetative state


Anemia


Hypokalemia - total body depl


Hypomagnesemia - severe


Lactic acidosis


PAFib with RVR


CRITICAL & GUARDED











Abx


Resp support


IVF hydration


Taper off pressors


Hold carvedilol and amlodipine until off pressors


PRBC transfusion


Additional IV Mg++ and K+











Jamie Jo MD                Sep 27, 2020 17:05

## 2020-09-27 NOTE — CARDIOLOGY REPORT
--------------- APPROVED REPORT --------------





EKG Measurement

Heart [id number]

[id number]

[id number]

[id number]



<Conclusion>

Sinus tachycardia with PACs

Nonspecific ST abnormality

Abnormal ECG

## 2020-09-28 VITALS — SYSTOLIC BLOOD PRESSURE: 109 MMHG | DIASTOLIC BLOOD PRESSURE: 43 MMHG

## 2020-09-28 VITALS — SYSTOLIC BLOOD PRESSURE: 115 MMHG | DIASTOLIC BLOOD PRESSURE: 28 MMHG

## 2020-09-28 VITALS — SYSTOLIC BLOOD PRESSURE: 127 MMHG | DIASTOLIC BLOOD PRESSURE: 35 MMHG

## 2020-09-28 VITALS — DIASTOLIC BLOOD PRESSURE: 36 MMHG | SYSTOLIC BLOOD PRESSURE: 122 MMHG

## 2020-09-28 VITALS — DIASTOLIC BLOOD PRESSURE: 40 MMHG | SYSTOLIC BLOOD PRESSURE: 121 MMHG

## 2020-09-28 VITALS — SYSTOLIC BLOOD PRESSURE: 120 MMHG | DIASTOLIC BLOOD PRESSURE: 30 MMHG

## 2020-09-28 VITALS — DIASTOLIC BLOOD PRESSURE: 30 MMHG | SYSTOLIC BLOOD PRESSURE: 106 MMHG

## 2020-09-28 VITALS — SYSTOLIC BLOOD PRESSURE: 112 MMHG | DIASTOLIC BLOOD PRESSURE: 35 MMHG

## 2020-09-28 VITALS — DIASTOLIC BLOOD PRESSURE: 59 MMHG | SYSTOLIC BLOOD PRESSURE: 117 MMHG

## 2020-09-28 VITALS — SYSTOLIC BLOOD PRESSURE: 113 MMHG | DIASTOLIC BLOOD PRESSURE: 45 MMHG

## 2020-09-28 VITALS — DIASTOLIC BLOOD PRESSURE: 45 MMHG | SYSTOLIC BLOOD PRESSURE: 116 MMHG

## 2020-09-28 VITALS — DIASTOLIC BLOOD PRESSURE: 45 MMHG | SYSTOLIC BLOOD PRESSURE: 101 MMHG

## 2020-09-28 VITALS — SYSTOLIC BLOOD PRESSURE: 117 MMHG | DIASTOLIC BLOOD PRESSURE: 39 MMHG

## 2020-09-28 VITALS — SYSTOLIC BLOOD PRESSURE: 106 MMHG | DIASTOLIC BLOOD PRESSURE: 42 MMHG

## 2020-09-28 VITALS — SYSTOLIC BLOOD PRESSURE: 120 MMHG | DIASTOLIC BLOOD PRESSURE: 51 MMHG

## 2020-09-28 VITALS — DIASTOLIC BLOOD PRESSURE: 25 MMHG | SYSTOLIC BLOOD PRESSURE: 111 MMHG

## 2020-09-28 VITALS — DIASTOLIC BLOOD PRESSURE: 30 MMHG | SYSTOLIC BLOOD PRESSURE: 123 MMHG

## 2020-09-28 VITALS — DIASTOLIC BLOOD PRESSURE: 50 MMHG | SYSTOLIC BLOOD PRESSURE: 115 MMHG

## 2020-09-28 VITALS — SYSTOLIC BLOOD PRESSURE: 124 MMHG | DIASTOLIC BLOOD PRESSURE: 35 MMHG

## 2020-09-28 VITALS — SYSTOLIC BLOOD PRESSURE: 120 MMHG | DIASTOLIC BLOOD PRESSURE: 38 MMHG

## 2020-09-28 VITALS — SYSTOLIC BLOOD PRESSURE: 126 MMHG | DIASTOLIC BLOOD PRESSURE: 43 MMHG

## 2020-09-28 VITALS — DIASTOLIC BLOOD PRESSURE: 59 MMHG | SYSTOLIC BLOOD PRESSURE: 112 MMHG

## 2020-09-28 VITALS — DIASTOLIC BLOOD PRESSURE: 42 MMHG | SYSTOLIC BLOOD PRESSURE: 120 MMHG

## 2020-09-28 VITALS — SYSTOLIC BLOOD PRESSURE: 127 MMHG | DIASTOLIC BLOOD PRESSURE: 38 MMHG

## 2020-09-28 VITALS — DIASTOLIC BLOOD PRESSURE: 39 MMHG | SYSTOLIC BLOOD PRESSURE: 110 MMHG

## 2020-09-28 LAB
ADD MANUAL DIFF: NO
ALBUMIN SERPL-MCNC: 1.4 G/DL (ref 3.4–5)
ALBUMIN/GLOB SERPL: 0.3 {RATIO} (ref 1–2.7)
ALP SERPL-CCNC: 114 U/L (ref 46–116)
ALT SERPL-CCNC: 26 U/L (ref 12–78)
ANION GAP SERPL CALC-SCNC: 10 MMOL/L (ref 5–15)
AST SERPL-CCNC: 47 U/L (ref 15–37)
BILIRUB SERPL-MCNC: 0.6 MG/DL (ref 0.2–1)
BUN SERPL-MCNC: 5 MG/DL (ref 7–18)
CALCIUM SERPL-MCNC: 8 MG/DL (ref 8.5–10.1)
CHLORIDE SERPL-SCNC: 107 MMOL/L (ref 98–107)
CO2 SERPL-SCNC: 23 MMOL/L (ref 21–32)
CREAT SERPL-MCNC: 0.4 MG/DL (ref 0.55–1.3)
ERYTHROCYTE [DISTWIDTH] IN BLOOD BY AUTOMATED COUNT: 16.7 % (ref 11.6–14.8)
GLOBULIN SER-MCNC: 4.1 G/DL
HCT VFR BLD CALC: 22.7 % (ref 42–52)
HGB BLD-MCNC: 7.5 G/DL (ref 14.2–18)
MCV RBC AUTO: 80 FL (ref 80–99)
PLATELET # BLD: 178 K/UL (ref 150–450)
POTASSIUM SERPL-SCNC: 3.6 MMOL/L (ref 3.5–5.1)
RBC # BLD AUTO: 2.85 M/UL (ref 4.7–6.1)
SODIUM SERPL-SCNC: 140 MMOL/L (ref 136–145)
WBC # BLD AUTO: 9.3 K/UL (ref 4.8–10.8)

## 2020-09-28 RX ADMIN — MEROPENEM SCH MLS/HR: 1 INJECTION INTRAVENOUS at 13:10

## 2020-09-28 RX ADMIN — HEPARIN SODIUM SCH UNITS: 5000 INJECTION INTRAVENOUS; SUBCUTANEOUS at 20:49

## 2020-09-28 RX ADMIN — ZINC SULFATE CAP 220 MG (50 MG ELEMENTAL ZN) SCH MG: 220 (50 ZN) CAP at 08:43

## 2020-09-28 RX ADMIN — LEVETIRACETAM SCH MG: 100 SOLUTION ORAL at 08:43

## 2020-09-28 RX ADMIN — MEROPENEM SCH MLS/HR: 1 INJECTION INTRAVENOUS at 21:48

## 2020-09-28 RX ADMIN — SODIUM CHLORIDE AND POTASSIUM CHLORIDE SCH MLS/HR: 9; 1.49 INJECTION, SOLUTION INTRAVENOUS at 12:51

## 2020-09-28 RX ADMIN — SODIUM CHLORIDE SCH MLS/HR: 0.9 INJECTION INTRAVENOUS at 20:50

## 2020-09-28 RX ADMIN — Medication SCH MG: at 08:42

## 2020-09-28 RX ADMIN — LEVETIRACETAM SCH MG: 100 SOLUTION ORAL at 20:47

## 2020-09-28 RX ADMIN — MEROPENEM SCH MLS/HR: 1 INJECTION INTRAVENOUS at 05:51

## 2020-09-28 RX ADMIN — Medication SCH MG: at 18:17

## 2020-09-28 RX ADMIN — SODIUM CHLORIDE AND POTASSIUM CHLORIDE SCH MLS/HR: 9; 1.49 INJECTION, SOLUTION INTRAVENOUS at 23:46

## 2020-09-28 RX ADMIN — CHLORHEXIDINE GLUCONATE SCH APPLIC: 213 SOLUTION TOPICAL at 20:47

## 2020-09-28 RX ADMIN — SODIUM CHLORIDE AND POTASSIUM CHLORIDE SCH MLS/HR: 9; 1.49 INJECTION, SOLUTION INTRAVENOUS at 01:19

## 2020-09-28 RX ADMIN — HEPARIN SODIUM SCH UNITS: 5000 INJECTION INTRAVENOUS; SUBCUTANEOUS at 08:43

## 2020-09-28 NOTE — GENERAL PROGRESS NOTE
Subjective


ROS Limited/Unobtainable:  Yes


Constitutional:  Reports: malaise, weakness


HEENT:  Reports: no symptoms


Cardiovascular:  Reports: no symptoms


Respiratory:  Reports: shortness of breath


Gastrointestinal/Abdominal:  Reports: diarrhea, difficulty swallowing


Neurologic/Psychiatric:  Reports: pre-existing deficit


Endocrine:  Reports: no symptoms


Hematologic/Lymphatic:  Reports: anemia


Allergies:  


Coded Allergies:  


     No Known Allergies (Unverified , 9/24/20)


All Systems:  reviewed and negative except above


Subjective


no events. watery diarrhea. on the vent. pressors weaned down to 1mcg


no fevers. no bleeding noted. wbc stable.





Objective





Last 24 Hour Vital Signs








  Date Time  Temp Pulse Resp B/P (MAP) Pulse Ox O2 Delivery O2 Flow Rate FiO2


 


9/28/20 06:00  91 20 120/30 (60) 100   


 


9/28/20 05:00  91 20 120/30 (60) 100   


 


9/28/20 04:00  98      


 


9/28/20 04:00      Mechanical Ventilator  


 


9/28/20 04:00        40


 


9/28/20 04:00  87 21 115/28 (57) 100   


 


9/28/20 03:06  96 25     40


 


9/28/20 03:00 98.5 91 29 123/30 (61) 100   


 


9/28/20 02:00  92 26 117/59 (78) 100   


 


9/28/20 01:59    127/35    


 


9/28/20 01:00  88 26 126/43 (70) 100   


 


9/28/20 00:00 98.0 90 23 127/38 (67) 100   


 


9/28/20 00:00        40


 


9/28/20 00:00      Mechanical Ventilator  


 


9/28/20 00:00  97      


 


9/27/20 23:00    129/41    


 


9/27/20 23:00  89 27 129/41 (70) 100   


 


9/27/20 22:57  90 26     40


 


9/27/20 22:00    127/36    


 


9/27/20 22:00  89 25 127/35 (65) 100   


 


9/27/20 21:00    122/35    


 


9/27/20 21:00  90 27 122/35 (64) 100   


 


9/27/20 20:00  90      


 


9/27/20 20:00      Mechanical Ventilator  


 


9/27/20 20:00 98.2 92 22 126/38 (67) 100   


 


9/27/20 20:00    126/30    


 


9/27/20 20:00        40


 


9/27/20 19:38  88 25     40


 


9/27/20 19:00    124/31    


 


9/27/20 19:00  89 26 117/39 (65) 100   


 


9/27/20 18:00    124/51    


 


9/27/20 18:00  93 25 124/51 (75) 100   


 


9/27/20 17:00 98.7 88 25 129/35 (66) 100   


 


9/27/20 17:00    122/54    


 


9/27/20 16:00  85 24 126/35 (65) 100   


 


9/27/20 16:00        40


 


9/27/20 16:00      Mechanical Ventilator  


 


9/27/20 16:00  84      


 


9/27/20 16:00    127/33    


 


9/27/20 15:08  88 26     40


 


9/27/20 15:00  86 21 117/38 (64) 100   


 


9/27/20 15:00    117/38    


 


9/27/20 14:30  82 21 129/34 (65) 100   


 


9/27/20 14:00    125/37    


 


9/27/20 14:00  81 26 125/37 (66) 100   


 


9/27/20 13:00  86 26 129/31 (63) 100   


 


9/27/20 13:00    129/31    


 


9/27/20 12:30  84 19 121/42 (68) 100   


 


9/27/20 12:06  84      


 


9/27/20 12:00        40


 


9/27/20 12:00      Mechanical Ventilator  


 


9/27/20 12:00    130/29    


 


9/27/20 12:00 98.6 86 24 130/29 (62) 100   


 


9/27/20 11:36    132/33    


 


9/27/20 11:30  85 24 130/29 (62) 100   


 


9/27/20 11:12  85 21     40


 


9/27/20 11:00  86 24 132/39 (70) 100   


 


9/27/20 11:00    124/35    


 


9/27/20 10:30  87 23 132/33 (66) 100   


 


9/27/20 10:00  84 21 124/40 (68) 100   


 


9/27/20 10:00    132/33    


 


9/27/20 09:30  84 23 119/42 (67) 100   


 


9/27/20 09:00    118/42    


 


9/27/20 09:00 98.8 86 22 126/32 (63) 100   


 


9/27/20 08:00      Mechanical Ventilator  


 


9/27/20 08:00  88 17 128/33 (64) 100   


 


9/27/20 08:00        40


 


9/27/20 08:00    116/46    


 


9/27/20 08:00  90      


 


9/27/20 07:12  85 23     40

















Intake and Output  


 


 9/27/20 9/28/20





 19:00 07:00


 


Intake Total 1468.75 ml 1151.25 ml


 


Output Total 1600 ml 2210 ml


 


Balance -131.25 ml -1058.75 ml


 


  


 


IV Total 1348.75 ml 1151.25 ml


 


Other 120 ml 


 


Output Urine Total 800 ml 1010 ml


 


Stool Total 800 ml 1200 ml








Laboratory Tests


9/27/20 11:20: 


White Blood Count 12.0H, Red Blood Count 2.82L, Hemoglobin 7.5L, Hematocrit 

22.4L, Mean Corpuscular Volume 79L, Mean Corpuscular Hemoglobin 26.5L, Mean 

Corpuscular Hemoglobin Concent 33.5, Red Cell Distribution Width 17.1H, Platelet

Count 181, Mean Platelet Volume 7.3, Neutrophils (%) (Auto) , Lymphocytes (%) 

(Auto) , Monocytes (%) (Auto) , Eosinophils (%) (Auto) , Basophils (%) (Auto) , 

Differential Total Cells Counted 100, Neutrophils % (Manual) 76H, Lymphocytes % 

(Manual) 20, Monocytes % (Manual) 4, Eosinophils % (Manual) 0, Basophils % 

(Manual) 0, Band Neutrophils 0, Platelet Estimate Adequate, Platelet Morphology 

Normal, Hypochromasia 3+, Anisocytosis 1+, Microcytosis 1+, Spherocytes 2+


9/28/20 04:18: 


White Blood Count 9.3, Red Blood Count 2.85L, Hemoglobin 7.5L, Hematocrit 22.7L,

Mean Corpuscular Volume 80, Mean Corpuscular Hemoglobin 26.3L, Mean Corpuscular 

Hemoglobin Concent 32.9, Red Cell Distribution Width 16.7H, Platelet Count 178, 

Mean Platelet Volume 7.0, Neutrophils (%) (Auto) , Lymphocytes (%) (Auto) , 

Monocytes (%) (Auto) , Eosinophils (%) (Auto) , Basophils (%) (Auto) , Sodium 

Level 140, Potassium Level 3.6, Chloride Level 107, Carbon Dioxide Level 23, 

Anion Gap 10, Blood Urea Nitrogen 5L, Creatinine 0.4L, Estimat Glomerular 

Filtration Rate > 60, Glucose Level 76, Calcium Level 8.0L, Total Bilirubin 0.6,

Aspartate Amino Transf (AST/SGOT) 47H, Alanine Aminotransferase (ALT/SGPT) 26, 

Alkaline Phosphatase 114, Total Protein 5.5L, Albumin 1.4L, Globulin 4.1, 

Albumin/Globulin Ratio 0.3L


Height (Feet):  5


Height (Inches):  10.00


Weight (Pounds):  200


Objective


General Appearance:  WD/WN, lethargic, confused


EENT:  normal ENT inspection


Neck:  non-tender, normal alignment


Cardiovascular:  normal peripheral pulses, normal rate, regular rhythm


Respiratory/Chest:  chest wall non-tender, lungs clear, normal breath sounds


Abdomen:  normal bowel sounds, non tender, soft, no organomegaly


Edema:  no edema noted Arm (L), no edema noted Arm (R)


Neurologic:  disoriented, unresponsive





Assessment/Plan


Problem List:  


(1) Pneumonia


ICD Codes:  J18.9 - Pneumonia, unspecified organism


SNOMED:  731997195


(2) Rapid atrial fibrillation


ICD Codes:  I48.91 - Unspecified atrial fibrillation


SNOMED:  066645829


(3) Sepsis


ICD Codes:  A41.9 - Sepsis, unspecified organism


SNOMED:  81777848


Status:  stable


Assessment/Plan:


cont pressors


wean as able


iv abx


follow up cultures


check stool cdiff


transfuse as needed


monitor for bleeding


replace lytes


PPI rx


replace lytes


cards and ID appreciated


critical and guarded











Brijesh Dewey MD              Sep 28, 2020 07:06

## 2020-09-28 NOTE — INFECTIOUS DISEASES PROG NOTE
Assessment/Plan


Assessment/Plan


antibiotics : meropenem





A


1. serratia sepsis


s.p catheter removal


2. e.coli UTI


3. r/o cholecystitis


4. shock resolved


5, leucocytosis resolved


6. respiratory failure s/p tracheostomy


7. seizures





P


1. continue meropenem


2. HIDA scan


3. will follow up cultures





Subjective


ROS Limited/Unobtainable:  Yes


Allergies:  


Coded Allergies:  


     No Known Allergies (Unverified , 9/24/20)





Objective





Last 24 Hour Vital Signs








  Date Time  Temp Pulse Resp B/P (MAP) Pulse Ox O2 Delivery O2 Flow Rate FiO2


 


9/28/20 11:00  101 24 115/50 (71) 100   


 


9/28/20 10:00  100 23 111/25 (53) 100   


 


9/28/20 09:00  90 23 122/36 (64) 100   


 


9/28/20 08:00  95      


 


9/28/20 08:00 98.3 94 25 120/42 (68) 100   


 


9/28/20 08:00        40


 


9/28/20 07:05  91 22     40


 


9/28/20 07:00  93 28 120/38 (65) 100   


 


9/28/20 06:00  91 20 120/30 (60) 100   


 


9/28/20 05:00  91 20 120/30 (60) 100   


 


9/28/20 04:00  98      


 


9/28/20 04:00      Mechanical Ventilator  


 


9/28/20 04:00        40


 


9/28/20 04:00  87 21 115/28 (57) 100   


 


9/28/20 03:06  96 25     40


 


9/28/20 03:00 98.5 91 29 123/30 (61) 100   


 


9/28/20 02:00  92 26 117/59 (78) 100   


 


9/28/20 01:59    127/35    


 


9/28/20 01:00  88 26 126/43 (70) 100   


 


9/28/20 00:00 98.0 90 23 127/38 (67) 100   


 


9/28/20 00:00        40


 


9/28/20 00:00      Mechanical Ventilator  


 


9/28/20 00:00  97      


 


9/27/20 23:00    129/41    


 


9/27/20 23:00  89 27 129/41 (70) 100   


 


9/27/20 22:57  90 26     40


 


9/27/20 22:00    127/36    


 


9/27/20 22:00  89 25 127/35 (65) 100   


 


9/27/20 21:00    122/35    


 


9/27/20 21:00  90 27 122/35 (64) 100   


 


9/27/20 20:00  90      


 


9/27/20 20:00      Mechanical Ventilator  


 


9/27/20 20:00 98.2 92 22 126/38 (67) 100   


 


9/27/20 20:00    126/30    


 


9/27/20 20:00        40


 


9/27/20 19:38  88 25     40


 


9/27/20 19:00    124/31    


 


9/27/20 19:00  89 26 117/39 (65) 100   


 


9/27/20 18:00    124/51    


 


9/27/20 18:00  93 25 124/51 (75) 100   


 


9/27/20 17:00 98.7 88 25 129/35 (66) 100   


 


9/27/20 17:00    122/54    


 


9/27/20 16:00  85 24 126/35 (65) 100   


 


9/27/20 16:00        40


 


9/27/20 16:00      Mechanical Ventilator  


 


9/27/20 16:00  84      


 


9/27/20 16:00    127/33    


 


9/27/20 15:08  88 26     40


 


9/27/20 15:00  86 21 117/38 (64) 100   


 


9/27/20 15:00    117/38    


 


9/27/20 14:30  82 21 129/34 (65) 100   


 


9/27/20 14:00    125/37    


 


9/27/20 14:00  81 26 125/37 (66) 100   


 


9/27/20 13:00  86 26 129/31 (63) 100   


 


9/27/20 13:00    129/31    


 


9/27/20 12:30  84 19 121/42 (68) 100   


 


9/27/20 12:06  84      


 


9/27/20 12:00        40


 


9/27/20 12:00      Mechanical Ventilator  


 


9/27/20 12:00    130/29    


 


9/27/20 12:00 98.6 86 24 130/29 (62) 100   








Height (Feet):  5


Height (Inches):  10.00


Weight (Pounds):  200


HEENT:  status post trach


Respiratory/Chest:  lungs clear


Cardiovascular:  normal rate, regular rhythm, no gallop/murmur


Abdomen:  soft, non tender, other - GT


Extremities:  no edema, other - right arm PICC





Microbiology








 Date/Time


Source Procedure


Growth Status





 


 9/25/20 21:45


Stool Clostridium difficile Toxin Assay - Final Complete











Laboratory Tests








Test


 9/28/20


04:18


 


White Blood Count


 9.3 K/UL


(4.8-10.8)


 


Red Blood Count


 2.85 M/UL


(4.70-6.10)  L


 


Hemoglobin


 7.5 G/DL


(14.2-18.0)  L


 


Hematocrit


 22.7 %


(42.0-52.0)  L


 


Mean Corpuscular Volume 80 FL (80-99)  


 


Mean Corpuscular Hemoglobin


 26.3 PG


(27.0-31.0)  L


 


Mean Corpuscular Hemoglobin


Concent 32.9 G/DL


(32.0-36.0)


 


Red Cell Distribution Width


 16.7 %


(11.6-14.8)  H


 


Platelet Count


 178 K/UL


(150-450)


 


Mean Platelet Volume


 7.0 FL


(6.5-10.1)


 


Neutrophils (%) (Auto)


 % (45.0-75.0)





 


Lymphocytes (%) (Auto)


 % (20.0-45.0)





 


Monocytes (%) (Auto)  % (1.0-10.0)  


 


Eosinophils (%) (Auto)  % (0.0-3.0)  


 


Basophils (%) (Auto)  % (0.0-2.0)  


 


Sodium Level


 140 MMOL/L


(136-145)


 


Potassium Level


 3.6 MMOL/L


(3.5-5.1)


 


Chloride Level


 107 MMOL/L


()


 


Carbon Dioxide Level


 23 MMOL/L


(21-32)


 


Anion Gap


 10 mmol/L


(5-15)


 


Blood Urea Nitrogen


 5 mg/dL (7-18)


L


 


Creatinine


 0.4 MG/DL


(0.55-1.30)  L


 


Estimat Glomerular Filtration


Rate > 60 mL/min


(>60)


 


Glucose Level


 76 MG/DL


()


 


Calcium Level


 8.0 MG/DL


(8.5-10.1)  L


 


Total Bilirubin


 0.6 MG/DL


(0.2-1.0)


 


Aspartate Amino Transf


(AST/SGOT) 47 U/L (15-37)


H


 


Alanine Aminotransferase


(ALT/SGPT) 26 U/L (12-78)





 


Alkaline Phosphatase


 114 U/L


()


 


Total Protein


 5.5 G/DL


(6.4-8.2)  L


 


Albumin


 1.4 G/DL


(3.4-5.0)  L


 


Globulin 4.1 g/dL  


 


Albumin/Globulin Ratio


 0.3 (1.0-2.7)


L











Current Medications








 Medications


  (Trade)  Dose


 Ordered  Sig/Tony


 Route


 PRN Reason  Start Time


 Stop Time Status Last Admin


Dose Admin


 


 Acetaminophen


  (Tylenol)  650 mg  Q6H  PRN


 GT


 Mild Pain (Pain Scale 1-3)  9/24/20 18:45


 10/24/20 18:44  9/24/20 21:39





 


 Acetaminophen


  (Tylenol)  650 mg  Q6H  PRN


 GT


 Temp >100.5  9/24/20 19:15


 10/24/20 19:14  9/25/20 17:13





 


 Ascorbic Acid


  (Vitamin C)  500 mg  TWICE A  DAY


 GT


   9/25/20 09:00


 10/25/20 08:59  9/28/20 08:42





 


 Bisacodyl


  (Dulcolax)  10 mg  DAILYPRN  PRN


 RECTAL


 Constipation  9/24/20 18:45


 12/23/20 18:44   





 


 Chlorhexidine


 Gluconate


  (Holli-Hex 2%)  1 applic  DAILY@2000


 TOPIC


   9/25/20 20:00


 12/24/20 19:59  9/27/20 20:29





 


 Diphenoxylate HCl/


 Atropine


  (Lomotil)  2.5 mg  Q4H  PRN


 GT


 Diarrhea  9/28/20 07:15


 10/28/20 07:14   





 


 Famotidine


  (Pepcid)  20 mg  TWICE A  DAY


 GT


   9/24/20 18:45


 12/23/20 18:44  9/28/20 08:42





 


 Heparin Sodium


  (Porcine)


  (Heparin 5000


 units/ml)  5,000 units  EVERY 12  HOURS


 SUBQ


   9/24/20 21:00


 11/8/20 20:59  9/28/20 08:43





 


 Iron Sucrose 100


 mg/Sodium Chloride  60 ml @ 


 240 mls/hr  BEDTIME


 IV


   9/28/20 21:00


 10/2/20 21:14   





 


 Levetiracetam


  (Keppra)  1,000 mg  EVERY 12  HOURS


 GT


   9/24/20 21:00


 10/24/20 20:59  9/28/20 08:43





 


 Meropenem 1 gm/


 Sodium Chloride  55 ml @ 


 110 mls/hr  Q8HR


 IVPB


   9/26/20 14:00


 10/1/20 13:59  9/28/20 05:51





 


 Norepinephrine


 Bitartrate  250 ml @ 0


 mls/hr  Q24H


 IV


   9/28/20 01:45


 10/1/20 01:39  9/28/20 01:59





 


 Potassium


 Chloride/Sodium


 Chloride  1,000 ml @ 


 100 mls/hr  Q10H


 IV


   9/26/20 00:30


 10/26/20 00:29  9/28/20 01:19





 


 Zinc Sulfate


  (Zinc Sulfate)  220 mg  DAILY


 GT


   9/24/20 21:00


 12/23/20 20:59  9/28/20 08:43




















Frank Bernardo MD      Sep 28, 2020 11:52

## 2020-09-28 NOTE — CONSULTATION
History of Present Illness


General


Chief Complaint:  Dyspnea/Respdistress


Referring physician:  Maco


Reason for Consultation:  as above





Present Illness


HPI


This is a very unfortunate 60-year-old male who is in a vegetative state 

secondary to intracranial bleed ruptured aneurysm s/p craniotomy who is now 

trach and vent dependent, feeding tube feed dependent presents for evaluation of

hypotension and respiratory distress.  From his nursing facility he was found 

increasingly tachypneic with low blood pressure systolics in the 90.  No further

information provided. atypical occipital wound with pus on admission.  surgical 

staples in abdomen and scalp.  surgery called to evaluate. patient seen, chart 

reviewed, patient examined. no n/v/f/c./  labs noted.  unable to provide history

or participate in exam.  on vent support.


Allergies:  


Coded Allergies:  


     No Known Allergies (Unverified , 9/24/20)





Medication History


Scheduled


Amlodipine Besylate* (Amlodipine Besylate*), 5 MG GT DAILY, (Reported)


Arginine/Ascorbate Sod/Abram AC (Arginaid Powder), 1 EACH GT BID, (Reported)


Ascorbic Acid* (Ascorbic Acid*), 500 MG GT TWICE A DAY, (Reported)


Bacitracin Zinc* (Bacitracin Zinc*), 1 APPLIC TOPIC DAILY, (Reported)


Carvedilol* (Carvedilol*), 25 MG ORAL EVERY 12 HOURS, (Reported)


Dextran 70/Hypromellose (Artificial Tears Eye Drops*), 1 DROP BOTH EYES Q12HR, 

(Reported)


Famotidine* (Pepcid 20mg tablet*), 20 MG GT TWICE A DAY, (Reported)


Furosemide* (Lasix*), 20 MG GT DAILY, (Reported)


Hydralazine Hcl* (Hydralazine Hcl*), 10 MG GT Q4HR, (Reported)


Levetiracetam (Levetiracetam), 1,500 MG GT Q12HR, (Reported)


Lorazepam* (Ativan*), 1 MG GT Q4HR, (Reported)


Povidone-Iodine (Betadine), ML TP DAILY, (Reported)


Sodium Chloride* (Sodium Chloride*), 1 GM GT TID, (Reported)


Zinc Oxide (Skin Protectant), GM TP DAILY, (Reported)


Zinc Sulfate (Zinc Sulfate*), 220 MG GT DAILY, (Reported)





Scheduled PRN


Acetaminophen* (Acetaminophen*), 650 MG GT Q4H PRN for Mild Pain/Temp > 100.5, 

(Reported)


Bisacodyl (Dulcolax), 10 MG RC Q24H PRN for Constipation, (Reported)


Magnesium Hydroxide* (Milk Of Magnesia*), 30 ML GT DAILY PRN for Constipation, 

(Reported)


Ondansetron* (Zofran*), 4 MG GT Q6H PRN for Nausea & Vomiting, (Reported)





Discontinued Medications


Levetiracetam (Keppra), 1,000 MG ORAL EVERY 12 HOURS, (Reported)


   Discontinued Reason: Prescription changed





Patient History


Limited by:  medical condition


History Provided By:  Medical Record, PMD


Healthcare decision maker





Resuscitation status





Advanced Directive on File








Past Medical/Surgical History


Past Medical/Surgical History:  


(1) Pneumonia


(2) Rapid atrial fibrillation


(3) Sepsis





Review of Systems


All Other Systems:  negative except mentioned in HPI


ROS Narrative


limited given medical condition





Physical Exam


General Appearance:  no apparent distress


Lines, tubes and drains:  peripheral


HEENT:  anicteric, mucous membranes moist, other


Neck:  supple, trach


Respiratory/Chest:  decreased breath sounds, on vent


Cardiovascular/Chest:  normal rate, regular rhythm


Abdomen:  soft, no mass


Extremities:  non-tender


Skin Exam:  warm/dry


Neurologic:  unresponsiveness, aphasia





Last 24 Hour Vital Signs








  Date Time  Temp Pulse Resp B/P (MAP) Pulse Ox O2 Delivery O2 Flow Rate FiO2


 


9/28/20 19:00  102 29     40





        40


 


9/28/20 19:00  101 25 117/39 (65) 100   


 


9/28/20 18:00  102 32 101/45 (63) 100   


 


9/28/20 17:00  102 28 109/43 (65) 100   


 


9/28/20 16:00        40


 


9/28/20 16:00  106      


 


9/28/20 16:00 98.4 105 30 116/45 (68) 100   


 


9/28/20 16:00      Mechanical Ventilator  


 


9/28/20 15:05  106 25     40


 


9/28/20 15:00  105 23 113/45 (67) 100   


 


9/28/20 14:00  103 24 112/59 (76) 100   


 


9/28/20 13:00  102 23 110/39 (62) 100   


 


9/28/20 12:00      Mechanical Ventilator  


 


9/28/20 12:00 98.2 98 20 106/30 (55) 100   


 


9/28/20 12:00        40


 


9/28/20 12:00  98      


 


9/28/20 11:05  102 24     40


 


9/28/20 11:00  101 24 115/50 (71) 100   


 


9/28/20 10:00  100 23 111/25 (53) 100   


 


9/28/20 09:00  90 23 122/36 (64) 100   


 


9/28/20 08:00      Mechanical Ventilator  


 


9/28/20 08:00  95      


 


9/28/20 08:00 98.3 94 25 120/42 (68) 100   


 


9/28/20 08:00        40


 


9/28/20 07:05  91 22     40


 


9/28/20 07:00  93 28 120/38 (65) 100   


 


9/28/20 06:00  91 20 120/30 (60) 100   


 


9/28/20 05:00  91 20 120/30 (60) 100   


 


9/28/20 04:00  98      


 


9/28/20 04:00      Mechanical Ventilator  


 


9/28/20 04:00        40


 


9/28/20 04:00  87 21 115/28 (57) 100   


 


9/28/20 03:06  96 25     40


 


9/28/20 03:00 98.5 91 29 123/30 (61) 100   


 


9/28/20 02:00  92 26 117/59 (78) 100   


 


9/28/20 01:59    127/35    


 


9/28/20 01:00  88 26 126/43 (70) 100   


 


9/28/20 00:00 98.0 90 23 127/38 (67) 100   


 


9/28/20 00:00        40


 


9/28/20 00:00      Mechanical Ventilator  


 


9/28/20 00:00  97      


 


9/27/20 23:00    129/41    


 


9/27/20 23:00  89 27 129/41 (70) 100   


 


9/27/20 22:57  90 26     40


 


9/27/20 22:00    127/36    


 


9/27/20 22:00  89 25 127/35 (65) 100   

















Intake and Output  


 


 9/27/20 9/28/20





 19:00 07:00


 


Intake Total 1468.75 ml 1255.00 ml


 


Output Total 1600 ml 2260 ml


 


Balance -131.25 ml -1005.00 ml


 


  


 


IV Total 1348.75 ml 1255.00 ml


 


Other 120 ml 


 


Output Urine Total 800 ml 1060 ml


 


Stool Total 800 ml 1200 ml











Laboratory Tests








Test


 9/28/20


04:18


 


White Blood Count


 9.3 K/UL


(4.8-10.8)


 


Red Blood Count


 2.85 M/UL


(4.70-6.10)  L


 


Hemoglobin


 7.5 G/DL


(14.2-18.0)  L


 


Hematocrit


 22.7 %


(42.0-52.0)  L


 


Mean Corpuscular Volume 80 FL (80-99)  


 


Mean Corpuscular Hemoglobin


 26.3 PG


(27.0-31.0)  L


 


Mean Corpuscular Hemoglobin


Concent 32.9 G/DL


(32.0-36.0)


 


Red Cell Distribution Width


 16.7 %


(11.6-14.8)  H


 


Platelet Count


 178 K/UL


(150-450)


 


Mean Platelet Volume


 7.0 FL


(6.5-10.1)


 


Neutrophils (%) (Auto)


 % (45.0-75.0)





 


Lymphocytes (%) (Auto)


 % (20.0-45.0)





 


Monocytes (%) (Auto)  % (1.0-10.0)  


 


Eosinophils (%) (Auto)  % (0.0-3.0)  


 


Basophils (%) (Auto)  % (0.0-2.0)  


 


Sodium Level


 140 MMOL/L


(136-145)


 


Potassium Level


 3.6 MMOL/L


(3.5-5.1)


 


Chloride Level


 107 MMOL/L


()


 


Carbon Dioxide Level


 23 MMOL/L


(21-32)


 


Anion Gap


 10 mmol/L


(5-15)


 


Blood Urea Nitrogen


 5 mg/dL (7-18)


L


 


Creatinine


 0.4 MG/DL


(0.55-1.30)  L


 


Estimat Glomerular Filtration


Rate > 60 mL/min


(>60)


 


Glucose Level


 76 MG/DL


()


 


Calcium Level


 8.0 MG/DL


(8.5-10.1)  L


 


Total Bilirubin


 0.6 MG/DL


(0.2-1.0)


 


Aspartate Amino Transf


(AST/SGOT) 47 U/L (15-37)


H


 


Alanine Aminotransferase


(ALT/SGPT) 26 U/L (12-78)





 


Alkaline Phosphatase


 114 U/L


()


 


Total Protein


 5.5 G/DL


(6.4-8.2)  L


 


Albumin


 1.4 G/DL


(3.4-5.0)  L


 


Globulin 4.1 g/dL  


 


Albumin/Globulin Ratio


 0.3 (1.0-2.7)


L








Height (Feet):  5


Height (Inches):  10.00


Weight (Pounds):  200


Medications





Current Medications








 Medications


  (Trade)  Dose


 Ordered  Sig/Tony


 Route


 PRN Reason  Start Time


 Stop Time Status Last Admin


Dose Admin


 


 Acetaminophen


  (Tylenol)  650 mg  Q6H  PRN


 GT


 Mild Pain (Pain Scale 1-3)  9/24/20 18:45


 10/24/20 18:44  9/24/20 21:39





 


 Acetaminophen


  (Tylenol)  650 mg  Q6H  PRN


 GT


 Temp >100.5  9/24/20 19:15


 10/24/20 19:14  9/25/20 17:13





 


 Ascorbic Acid


  (Vitamin C)  500 mg  TWICE A  DAY


 GT


   9/25/20 09:00


 10/25/20 08:59  9/28/20 18:17





 


 Bisacodyl


  (Dulcolax)  10 mg  DAILYPRN  PRN


 RECTAL


 Constipation  9/24/20 18:45


 12/23/20 18:44   





 


 Chlorhexidine


 Gluconate


  (Holli-Hex 2%)  1 applic  DAILY@2000


 TOPIC


   9/25/20 20:00


 12/24/20 19:59  9/28/20 20:47





 


 Diphenoxylate HCl/


 Atropine


  (Lomotil)  2.5 mg  Q4H  PRN


 GT


 Diarrhea  9/28/20 07:15


 10/28/20 07:14   





 


 Famotidine


  (Pepcid)  20 mg  TWICE A  DAY


 GT


   9/24/20 18:45


 12/23/20 18:44  9/28/20 18:17





 


 Heparin Sodium


  (Porcine)


  (Heparin 5000


 units/ml)  5,000 units  EVERY 12  HOURS


 SUBQ


   9/24/20 21:00


 11/8/20 20:59  9/28/20 20:49





 


 Iron Sucrose 100


 mg/Sodium Chloride  60 ml @ 


 240 mls/hr  BEDTIME


 IV


   9/28/20 21:00


 10/2/20 21:14  9/28/20 20:50





 


 Levetiracetam


  (Keppra)  1,000 mg  EVERY 12  HOURS


 GT


   9/24/20 21:00


 10/24/20 20:59  9/28/20 20:47





 


 Meropenem 1 gm/


 Sodium Chloride  55 ml @ 


 110 mls/hr  Q8HR


 IVPB


   9/26/20 14:00


 10/1/20 13:59  9/28/20 13:10





 


 Norepinephrine


 Bitartrate  250 ml @ 0


 mls/hr  Q24H


 IV


   9/28/20 01:45


 10/1/20 01:39  9/28/20 01:59





 


 Potassium


 Chloride/Sodium


 Chloride  1,000 ml @ 


 100 mls/hr  Q10H


 IV


   9/26/20 00:30


 10/26/20 00:29  9/28/20 12:51





 


 Zinc Sulfate


  (Zinc Sulfate)  220 mg  DAILY


 GT


   9/24/20 21:00


 12/23/20 20:59  9/28/20 08:43














Assessment/Plan


Problem List:  


(1) Laceration of scalp


Assessment & Plan:  patient noted to have right occipital scalp laceration 5cm 

1mm dehiscence.  serous oozing.  no pus at this time.  


staples noted to frontal right scalp 


staples removed


dressings applied to wound 


will monitor


no acute surgical intervention planned


ICD Codes:  S01.01XA - Laceration without foreign body of scalp, initial 

encounter


SNOMED:  008722325


(2) Pneumonia


ICD Codes:  J18.9 - Pneumonia, unspecified organism


SNOMED:  428815073


(3) Rapid atrial fibrillation


ICD Codes:  I48.91 - Unspecified atrial fibrillation


SNOMED:  [id number]


(4) Sepsis


Assessment & Plan:  leukocytosis


anemia


scalp lac with pus drainage prior now clear


abd wound with staples in place


pna


micro noted


on abx


labs improving


staples removed from scalp and abdomen


wounds evaluated and care plan initiated


will monitor


thank you


ICD Codes:  A41.9 - Sepsis, unspecified organism


SNOMED:  [id number]











Valentino Coleman                Sep 28, 2020 21:33

## 2020-09-29 VITALS — SYSTOLIC BLOOD PRESSURE: 119 MMHG | DIASTOLIC BLOOD PRESSURE: 34 MMHG

## 2020-09-29 VITALS — SYSTOLIC BLOOD PRESSURE: 133 MMHG | DIASTOLIC BLOOD PRESSURE: 67 MMHG

## 2020-09-29 VITALS — SYSTOLIC BLOOD PRESSURE: 136 MMHG | DIASTOLIC BLOOD PRESSURE: 68 MMHG

## 2020-09-29 VITALS — SYSTOLIC BLOOD PRESSURE: 116 MMHG | DIASTOLIC BLOOD PRESSURE: 44 MMHG

## 2020-09-29 VITALS — SYSTOLIC BLOOD PRESSURE: 122 MMHG | DIASTOLIC BLOOD PRESSURE: 41 MMHG

## 2020-09-29 VITALS — DIASTOLIC BLOOD PRESSURE: 49 MMHG | SYSTOLIC BLOOD PRESSURE: 133 MMHG

## 2020-09-29 VITALS — DIASTOLIC BLOOD PRESSURE: 68 MMHG | SYSTOLIC BLOOD PRESSURE: 131 MMHG

## 2020-09-29 VITALS — DIASTOLIC BLOOD PRESSURE: 36 MMHG | SYSTOLIC BLOOD PRESSURE: 117 MMHG

## 2020-09-29 VITALS — SYSTOLIC BLOOD PRESSURE: 115 MMHG | DIASTOLIC BLOOD PRESSURE: 53 MMHG

## 2020-09-29 VITALS — SYSTOLIC BLOOD PRESSURE: 120 MMHG | DIASTOLIC BLOOD PRESSURE: 40 MMHG

## 2020-09-29 LAB
ADD MANUAL DIFF: YES
ALBUMIN SERPL-MCNC: 1.4 G/DL (ref 3.4–5)
ALBUMIN/GLOB SERPL: 0.4 {RATIO} (ref 1–2.7)
ALP SERPL-CCNC: 92 U/L (ref 46–116)
ALT SERPL-CCNC: 28 U/L (ref 12–78)
ANION GAP SERPL CALC-SCNC: 11 MMOL/L (ref 5–15)
AST SERPL-CCNC: 52 U/L (ref 15–37)
BILIRUB SERPL-MCNC: 0.6 MG/DL (ref 0.2–1)
BUN SERPL-MCNC: 4 MG/DL (ref 7–18)
CALCIUM SERPL-MCNC: 7.9 MG/DL (ref 8.5–10.1)
CHLORIDE SERPL-SCNC: 110 MMOL/L (ref 98–107)
CO2 SERPL-SCNC: 20 MMOL/L (ref 21–32)
CREAT SERPL-MCNC: 0.4 MG/DL (ref 0.55–1.3)
ERYTHROCYTE [DISTWIDTH] IN BLOOD BY AUTOMATED COUNT: 17.5 % (ref 11.6–14.8)
GLOBULIN SER-MCNC: 3.4 G/DL
HCT VFR BLD CALC: 20.8 % (ref 42–52)
HGB BLD-MCNC: 6.8 G/DL (ref 14.2–18)
MCV RBC AUTO: 80 FL (ref 80–99)
PLATELET # BLD: 192 K/UL (ref 150–450)
POTASSIUM SERPL-SCNC: 3.8 MMOL/L (ref 3.5–5.1)
RBC # BLD AUTO: 2.59 M/UL (ref 4.7–6.1)
SODIUM SERPL-SCNC: 141 MMOL/L (ref 136–145)
WBC # BLD AUTO: 9.8 K/UL (ref 4.8–10.8)

## 2020-09-29 RX ADMIN — HEPARIN SODIUM SCH UNITS: 5000 INJECTION INTRAVENOUS; SUBCUTANEOUS at 20:31

## 2020-09-29 RX ADMIN — LEVETIRACETAM SCH MG: 100 SOLUTION ORAL at 20:30

## 2020-09-29 RX ADMIN — SODIUM CHLORIDE AND POTASSIUM CHLORIDE SCH MLS/HR: 9; 1.49 INJECTION, SOLUTION INTRAVENOUS at 08:53

## 2020-09-29 RX ADMIN — MEROPENEM SCH MLS/HR: 1 INJECTION INTRAVENOUS at 14:00

## 2020-09-29 RX ADMIN — LEVETIRACETAM SCH MG: 100 SOLUTION ORAL at 09:22

## 2020-09-29 RX ADMIN — Medication SCH MG: at 18:32

## 2020-09-29 RX ADMIN — MEROPENEM SCH MLS/HR: 1 INJECTION INTRAVENOUS at 05:17

## 2020-09-29 RX ADMIN — SODIUM CHLORIDE AND POTASSIUM CHLORIDE SCH MLS/HR: 9; 1.49 INJECTION, SOLUTION INTRAVENOUS at 18:33

## 2020-09-29 RX ADMIN — Medication SCH MG: at 09:22

## 2020-09-29 RX ADMIN — HEPARIN SODIUM SCH UNITS: 5000 INJECTION INTRAVENOUS; SUBCUTANEOUS at 09:23

## 2020-09-29 RX ADMIN — CHLORHEXIDINE GLUCONATE SCH APPLIC: 213 SOLUTION TOPICAL at 20:30

## 2020-09-29 RX ADMIN — ZINC SULFATE CAP 220 MG (50 MG ELEMENTAL ZN) SCH MG: 220 (50 ZN) CAP at 09:25

## 2020-09-29 RX ADMIN — SODIUM CHLORIDE SCH MLS/HR: 0.9 INJECTION INTRAVENOUS at 20:31

## 2020-09-29 RX ADMIN — MEROPENEM SCH MLS/HR: 1 INJECTION INTRAVENOUS at 22:10

## 2020-09-29 NOTE — GENERAL PROGRESS NOTE
Subjective


ROS Limited/Unobtainable:  Yes


Constitutional:  Reports: malaise, weakness


HEENT:  Reports: no symptoms


Cardiovascular:  Reports: no symptoms


Respiratory:  Reports: sputum


Gastrointestinal/Abdominal:  Reports: difficulty swallowing


Genitourinary:  Reports: no symptoms


Neurologic/Psychiatric:  Reports: pre-existing deficit, seizure


Endocrine:  Reports: no symptoms


Hematologic/Lymphatic:  Reports: anemia


Allergies:  


Coded Allergies:  


     No Known Allergies (Unverified , 9/24/20)


All Systems:  reviewed and negative except above


Subjective


bp stable. transferred out of the icu. no reports of bleeding. WBC trend down. 

tolerating feeds.





Objective





Last 24 Hour Vital Signs








  Date Time  Temp Pulse Resp B/P (MAP) Pulse Ox O2 Delivery O2 Flow Rate FiO2


 


9/29/20 08:00 98.2 92 23 133/49 (77) 100   


 


9/29/20 08:00        40


 


9/29/20 07:20  94 24     40


 


9/29/20 05:00  85 21 117/36 (63) 100   


 


9/29/20 04:00      Mechanical Ventilator  


 


9/29/20 04:00        40


 


9/29/20 04:00  87      


 


9/29/20 04:00  89 26 119/34 (62) 100   


 


9/29/20 03:11  93 24     40





        40


 


9/29/20 03:00 98.2 93 33 120/40 (66) 100   


 


9/29/20 02:00  104 26 116/44 (68) 100   


 


9/29/20 01:00  102 26 115/53 (73) 100   


 


9/29/20 00:00        40


 


9/29/20 00:00 98.4 105 21 122/41 (68) 100   


 


9/29/20 00:00      Mechanical Ventilator  


 


9/29/20 00:00  104      


 


9/28/20 23:04  102 25     40





        40


 


9/28/20 23:00  105 29 106/42 (63) 100   


 


9/28/20 22:00  117 30 112/35 (60) 100   


 


9/28/20 21:00  107 27 120/51 (74) 100   


 


9/28/20 20:00      Mechanical Ventilator  


 


9/28/20 20:00        40


 


9/28/20 20:00  108      


 


9/28/20 20:00 98.6 107 30 124/35 (64) 100   


 


9/28/20 19:00  102 29     40





        40


 


9/28/20 19:00  101 25 117/39 (65) 100   


 


9/28/20 18:00  102 32 101/45 (63) 100   


 


9/28/20 17:00  102 28 109/43 (65) 100   


 


9/28/20 16:00        40


 


9/28/20 16:00  106      


 


9/28/20 16:00 98.4 105 30 116/45 (68) 100   


 


9/28/20 16:00      Mechanical Ventilator  


 


9/28/20 15:05  106 25     40


 


9/28/20 15:00  105 23 113/45 (67) 100   


 


9/28/20 14:00  103 24 112/59 (76) 100   


 


9/28/20 13:00  102 23 110/39 (62) 100   


 


9/28/20 12:00      Mechanical Ventilator  


 


9/28/20 12:00 98.2 98 20 106/30 (55) 100   


 


9/28/20 12:00        40


 


9/28/20 12:00  98      


 


9/28/20 11:05  102 24     40


 


9/28/20 11:00  101 24 115/50 (71) 100   


 


9/28/20 10:00  100 23 111/25 (53) 100   

















Intake and Output  


 


 9/28/20 9/29/20





 18:59 06:59


 


Intake Total 1077.50 ml 615 ml


 


Output Total 1360 ml 1075 ml


 


Balance -282.50 ml -460 ml


 


  


 


IV Total 1077.50 ml 615 ml


 


Output Urine Total 460 ml 475 ml


 


Stool Total 900 ml 600 ml








Laboratory Tests


9/29/20 04:20: 


White Blood Count 9.8, Red Blood Count 2.59L, Hemoglobin 6.8*L, Hematocrit 20.8L

, Mean Corpuscular Volume 80, Mean Corpuscular Hemoglobin 26.3L, Mean 

Corpuscular Hemoglobin Concent 32.7, Red Cell Distribution Width 17.5H, Platelet

Count 192, Mean Platelet Volume 6.8, Neutrophils (%) (Auto) , Lymphocytes (%) 

(Auto) , Monocytes (%) (Auto) , Eosinophils (%) (Auto) , Basophils (%) (Auto) , 

Differential Total Cells Counted 100, Neutrophils % (Manual) 71, Lymphocytes % 

(Manual) 26, Monocytes % (Manual) 3, Eosinophils % (Manual) 0, Basophils % 

(Manual) 0, Band Neutrophils 0, Platelet Estimate Adequate, Platelet Morphology 

Normal, Hypochromasia 1+, Anisocytosis 1+, Microcytosis 1+, Sodium Level 141, 

Potassium Level 3.8, Chloride Level 110H, Carbon Dioxide Level 20L, Anion Gap 

11, Blood Urea Nitrogen 4L, Creatinine 0.4L, Estimat Glomerular Filtration Rate 

> 60, Glucose Level 74, Calcium Level 7.9L, Total Bilirubin 0.6, Aspartate Amino

Transf (AST/SGOT) 52H, Alanine Aminotransferase (ALT/SGPT) 28, Alkaline 

Phosphatase 92, Total Protein 4.8L, Albumin 1.4L, Globulin 3.4, Albumin/Globulin

Ratio 0.4L


Height (Feet):  5


Height (Inches):  10.00


Weight (Pounds):  200


Objective


General Appearance:  WD/WN, lethargic, confused


EENT:  normal ENT inspection


Neck:  non-tender, normal alignment


Cardiovascular:  normal peripheral pulses, normal rate, regular rhythm


Respiratory/Chest:  chest wall non-tender, lungs clear, normal breath sounds


Abdomen:  normal bowel sounds, non tender, soft, no organomegaly


Edema:  no edema noted Arm (L), no edema noted Arm (R)


Neurologic:  disoriented, unresponsive





Assessment/Plan


Problem List:  


(1) Pneumonia


ICD Codes:  J18.9 - Pneumonia, unspecified organism


SNOMED:  735120945


(2) Rapid atrial fibrillation


ICD Codes:  I48.91 - Unspecified atrial fibrillation


SNOMED:  434274959


(3) Sepsis


ICD Codes:  A41.9 - Sepsis, unspecified organism


SNOMED:  00617073


Status:  stable


Assessment/Plan:


monitor bp


wean as able


iv abx


follow up cultures


transfuse as needed


monitor for bleeding


replace lytes


PPI rx


replace lytes


cards and ID appreciated


guarded but improving


message left with sister re: status and poc











Brijesh Dewey MD              Sep 29, 2020 09:11

## 2020-09-29 NOTE — CARDIOLOGY PROGRESS NOTE
Subjective


DATE OF SERVICE:  Sep 28, 2020


Patient remains in ICU


Still on pressor support for hypotension; tapering efforts successful.


Low hemoglobin, K+, and Mg++ noted; repletion on-going.





Objective





Last 24 Hour Vital Signs








  Date Time  Temp Pulse Resp B/P (MAP) Pulse Ox O2 Delivery O2 Flow Rate FiO2


 


9/29/20 01:00  102 26 115/53 (73) 100   


 


9/29/20 00:00        40


 


9/29/20 00:00  105 21 122/41 (68) 100   


 


9/29/20 00:00      Mechanical Ventilator  


 


9/29/20 00:00  104      


 


9/28/20 23:04  102 25     40





        40


 


9/28/20 23:00  105 29 106/42 (63) 100   


 


9/28/20 22:00  117 30 112/35 (60) 100   


 


9/28/20 21:00  107 27 120/51 (74) 100   


 


9/28/20 20:00      Mechanical Ventilator  


 


9/28/20 20:00        40


 


9/28/20 20:00  108      


 


9/28/20 20:00 98.6 107 30 124/35 (64) 100   


 


9/28/20 19:00  102 29     40





        40


 


9/28/20 19:00  101 25 117/39 (65) 100   


 


9/28/20 18:00  102 32 101/45 (63) 100   


 


9/28/20 17:00  102 28 109/43 (65) 100   


 


9/28/20 16:00        40


 


9/28/20 16:00  106      


 


9/28/20 16:00 98.4 105 30 116/45 (68) 100   


 


9/28/20 16:00      Mechanical Ventilator  


 


9/28/20 15:05  106 25     40


 


9/28/20 15:00  105 23 113/45 (67) 100   


 


9/28/20 14:00  103 24 112/59 (76) 100   


 


9/28/20 13:00  102 23 110/39 (62) 100   


 


9/28/20 12:00      Mechanical Ventilator  


 


9/28/20 12:00 98.2 98 20 106/30 (55) 100   


 


9/28/20 12:00        40


 


9/28/20 12:00  98      


 


9/28/20 11:05  102 24     40


 


9/28/20 11:00  101 24 115/50 (71) 100   


 


9/28/20 10:00  100 23 111/25 (53) 100   


 


9/28/20 09:00  90 23 122/36 (64) 100   


 


9/28/20 08:00      Mechanical Ventilator  


 


9/28/20 08:00  95      


 


9/28/20 08:00 98.3 94 25 120/42 (68) 100   


 


9/28/20 08:00        40


 


9/28/20 07:05  91 22     40


 


9/28/20 07:00  93 28 120/38 (65) 100   


 


9/28/20 06:00  91 20 120/30 (60) 100   


 


9/28/20 05:00  91 20 120/30 (60) 100   


 


9/28/20 04:00  98      


 


9/28/20 04:00      Mechanical Ventilator  


 


9/28/20 04:00        40


 


9/28/20 04:00  87 21 115/28 (57) 100   


 


9/28/20 03:06  96 25     40


 


9/28/20 03:00 98.5 91 29 123/30 (61) 100   








ROS: unchanged from my note of 9/24/20.


HEENT:  Thin Trach secretions


RHYTHM:  Afib


LUNGS:  bilateral rhonchi


CARDIAC:  irregularly irregular


ABDOMEN:  non tender, soft, distended, G-Tube intact


EXTREMITIES:  trace edema, other - Neuro: comatose state





Laboratory Tests








Test


 9/28/20


04:18


 


White Blood Count


 9.3 K/UL


(4.8-10.8)


 


Red Blood Count


 2.85 M/UL


(4.70-6.10)  L


 


Hemoglobin


 7.5 G/DL


(14.2-18.0)  L


 


Hematocrit


 22.7 %


(42.0-52.0)  L


 


Mean Corpuscular Volume 80 FL (80-99)  


 


Mean Corpuscular Hemoglobin


 26.3 PG


(27.0-31.0)  L


 


Mean Corpuscular Hemoglobin


Concent 32.9 G/DL


(32.0-36.0)


 


Red Cell Distribution Width


 16.7 %


(11.6-14.8)  H


 


Platelet Count


 178 K/UL


(150-450)


 


Mean Platelet Volume


 7.0 FL


(6.5-10.1)


 


Neutrophils (%) (Auto)


 % (45.0-75.0)





 


Lymphocytes (%) (Auto)


 % (20.0-45.0)





 


Monocytes (%) (Auto)  % (1.0-10.0)  


 


Eosinophils (%) (Auto)  % (0.0-3.0)  


 


Basophils (%) (Auto)  % (0.0-2.0)  


 


Sodium Level


 140 MMOL/L


(136-145)


 


Potassium Level


 3.6 MMOL/L


(3.5-5.1)


 


Chloride Level


 107 MMOL/L


()


 


Carbon Dioxide Level


 23 MMOL/L


(21-32)


 


Anion Gap


 10 mmol/L


(5-15)


 


Blood Urea Nitrogen


 5 mg/dL (7-18)


L


 


Creatinine


 0.4 MG/DL


(0.55-1.30)  L


 


Estimat Glomerular Filtration


Rate > 60 mL/min


(>60)


 


Glucose Level


 76 MG/DL


()


 


Calcium Level


 8.0 MG/DL


(8.5-10.1)  L


 


Total Bilirubin


 0.6 MG/DL


(0.2-1.0)


 


Aspartate Amino Transf


(AST/SGOT) 47 U/L (15-37)


H


 


Alanine Aminotransferase


(ALT/SGPT) 26 U/L (12-78)





 


Alkaline Phosphatase


 114 U/L


()


 


Total Protein


 5.5 G/DL


(6.4-8.2)  L


 


Albumin


 1.4 G/DL


(3.4-5.0)  L


 


Globulin 4.1 g/dL  


 


Albumin/Globulin Ratio


 0.3 (1.0-2.7)


L











Assessment/Plan


Assessment/Plan


Sepsis with shock


Respiratory failure


Chronic vegetative state


Anemia


Hypokalemia - total body depl


Hypomagnesemia - severe


Lactic acidosis


PAFib with RVR


CRITICAL & GUARDED











Abx


Resp support


IVF hydration


Taper off pressors 


Hold carvedilol and amlodipine until off pressors


PRBC transfusion


Replace IV Mg++ and K+ as needed











Jamie Jo MD                Sep 29, 2020 03:00

## 2020-09-29 NOTE — CARDIOLOGY PROGRESS NOTE
Subjective


DATE OF SERVICE:  Sep 29, 2020


Patient remains in ICU


Had been on pressor support for hypotension; now tapered off.


Low hemoglobin, K+, and Mg++ noted yesterday; repletion on-going.





Objective





Last 24 Hour Vital Signs








  Date Time  Temp Pulse Resp B/P (MAP) Pulse Ox O2 Delivery O2 Flow Rate FiO2


 


9/29/20 20:00 98.2 87 22 131/68 (89) 100   


 


9/29/20 20:00  95      


 


9/29/20 20:00      Mechanical Ventilator  


 


9/29/20 20:00        40


 


9/29/20 19:30  92 28     28


 


9/29/20 16:00  83      


 


9/29/20 16:00      Mechanical Ventilator  


 


9/29/20 16:00 97.9 63 20 133/67 (89) 99   


 


9/29/20 16:00        40


 


9/29/20 15:10  87 20     28


 


9/29/20 12:00        40


 


9/29/20 12:00      Mechanical Ventilator  


 


9/29/20 12:00 98.6 89 26 136/68 (90) 100   


 


9/29/20 10:50  89 21     35


 


9/29/20 08:00      Mechanical Ventilator  


 


9/29/20 08:00  89      


 


9/29/20 08:00 98.2 92 23 133/49 (77) 100   


 


9/29/20 08:00        40


 


9/29/20 07:20  94 24     40


 


9/29/20 05:00  85 21 117/36 (63) 100   


 


9/29/20 04:00      Mechanical Ventilator  


 


9/29/20 04:00        40


 


9/29/20 04:00  87      


 


9/29/20 04:00  89 26 119/34 (62) 100   


 


9/29/20 03:11  93 24     40





        40


 


9/29/20 03:00 98.2 93 33 120/40 (66) 100   


 


9/29/20 02:00  104 26 116/44 (68) 100   


 


9/29/20 01:00  102 26 115/53 (73) 100   


 


9/29/20 00:00        40


 


9/29/20 00:00 98.4 105 21 122/41 (68) 100   


 


9/29/20 00:00      Mechanical Ventilator  


 


9/29/20 00:00  104      


 


9/28/20 23:04  102 25     40





        40


 


9/28/20 23:00  105 29 106/42 (63) 100   








ROS: unchanged from my note of 9/24/20.


HEENT:  Thin Trach secretions


RHYTHM:  Afib


LUNGS:  bilateral rhonchi


CARDIAC:  irregularly irregular


ABDOMEN:  non tender, soft, distended, G-Tube intact


EXTREMITIES:  trace edema, other - Neuro: comatose state





Laboratory Tests








Test


 9/29/20


04:20


 


White Blood Count


 9.8 K/UL


(4.8-10.8)


 


Red Blood Count


 2.59 M/UL


(4.70-6.10)  L


 


Hemoglobin


 6.8 G/DL


(14.2-18.0)  *L


 


Hematocrit


 20.8 %


(42.0-52.0)  L


 


Mean Corpuscular Volume 80 FL (80-99)  


 


Mean Corpuscular Hemoglobin


 26.3 PG


(27.0-31.0)  L


 


Mean Corpuscular Hemoglobin


Concent 32.7 G/DL


(32.0-36.0)


 


Red Cell Distribution Width


 17.5 %


(11.6-14.8)  H


 


Platelet Count


 192 K/UL


(150-450)


 


Mean Platelet Volume


 6.8 FL


(6.5-10.1)


 


Neutrophils (%) (Auto)


 % (45.0-75.0)





 


Lymphocytes (%) (Auto)


 % (20.0-45.0)





 


Monocytes (%) (Auto)  % (1.0-10.0)  


 


Eosinophils (%) (Auto)  % (0.0-3.0)  


 


Basophils (%) (Auto)  % (0.0-2.0)  


 


Differential Total Cells


Counted 100  





 


Neutrophils % (Manual) 71 % (45-75)  


 


Lymphocytes % (Manual) 26 % (20-45)  


 


Monocytes % (Manual) 3 % (1-10)  


 


Eosinophils % (Manual) 0 % (0-3)  


 


Basophils % (Manual) 0 % (0-2)  


 


Band Neutrophils 0 % (0-8)  


 


Platelet Estimate Adequate  


 


Platelet Morphology Normal  


 


Hypochromasia 1+  


 


Anisocytosis 1+  


 


Microcytosis 1+  


 


Sodium Level


 141 MMOL/L


(136-145)


 


Potassium Level


 3.8 MMOL/L


(3.5-5.1)


 


Chloride Level


 110 MMOL/L


()  H


 


Carbon Dioxide Level


 20 MMOL/L


(21-32)  L


 


Anion Gap


 11 mmol/L


(5-15)


 


Blood Urea Nitrogen


 4 mg/dL (7-18)


L


 


Creatinine


 0.4 MG/DL


(0.55-1.30)  L


 


Estimat Glomerular Filtration


Rate > 60 mL/min


(>60)


 


Glucose Level


 74 MG/DL


()


 


Calcium Level


 7.9 MG/DL


(8.5-10.1)  L


 


Total Bilirubin


 0.6 MG/DL


(0.2-1.0)


 


Aspartate Amino Transf


(AST/SGOT) 52 U/L (15-37)


H


 


Alanine Aminotransferase


(ALT/SGPT) 28 U/L (12-78)





 


Alkaline Phosphatase


 92 U/L


()


 


Total Protein


 4.8 G/DL


(6.4-8.2)  L


 


Albumin


 1.4 G/DL


(3.4-5.0)  L


 


Globulin 3.4 g/dL  


 


Albumin/Globulin Ratio


 0.4 (1.0-2.7)


L











Assessment/Plan


Assessment/Plan


Sepsis with shock


Respiratory failure


Chronic vegetative state


Anemia


Hypokalemia - total body depl


Hypomagnesemia - severe


Lactic acidosis


PAFib with RVR


CRITICAL & GUARDED











Abx


Resp support


IVF hydration


Hold carvedilol and amlodipine until BP parameters warrent resumption.


PRBC transfusion as needed.


Replace IV Mg++ and K+ as needed











Jamie Jo MD                Sep 29, 2020 22:31

## 2020-09-30 VITALS — DIASTOLIC BLOOD PRESSURE: 78 MMHG | SYSTOLIC BLOOD PRESSURE: 132 MMHG

## 2020-09-30 VITALS — DIASTOLIC BLOOD PRESSURE: 70 MMHG | SYSTOLIC BLOOD PRESSURE: 119 MMHG

## 2020-09-30 VITALS — SYSTOLIC BLOOD PRESSURE: 139 MMHG | DIASTOLIC BLOOD PRESSURE: 86 MMHG

## 2020-09-30 VITALS — SYSTOLIC BLOOD PRESSURE: 133 MMHG | DIASTOLIC BLOOD PRESSURE: 93 MMHG

## 2020-09-30 VITALS — DIASTOLIC BLOOD PRESSURE: 75 MMHG | SYSTOLIC BLOOD PRESSURE: 143 MMHG

## 2020-09-30 VITALS — DIASTOLIC BLOOD PRESSURE: 76 MMHG | SYSTOLIC BLOOD PRESSURE: 135 MMHG

## 2020-09-30 LAB
ADD MANUAL DIFF: NO
ALBUMIN SERPL-MCNC: 1.5 G/DL (ref 3.4–5)
ALBUMIN/GLOB SERPL: 0.4 {RATIO} (ref 1–2.7)
ALP SERPL-CCNC: 94 U/L (ref 46–116)
ALT SERPL-CCNC: 25 U/L (ref 12–78)
ANION GAP SERPL CALC-SCNC: 9 MMOL/L (ref 5–15)
AST SERPL-CCNC: 44 U/L (ref 15–37)
BASOPHILS NFR BLD AUTO: 0.5 % (ref 0–2)
BILIRUB SERPL-MCNC: 0.8 MG/DL (ref 0.2–1)
BUN SERPL-MCNC: 4 MG/DL (ref 7–18)
CALCIUM SERPL-MCNC: 8.2 MG/DL (ref 8.5–10.1)
CHLORIDE SERPL-SCNC: 111 MMOL/L (ref 98–107)
CO2 SERPL-SCNC: 20 MMOL/L (ref 21–32)
CREAT SERPL-MCNC: 0.4 MG/DL (ref 0.55–1.3)
EOSINOPHIL NFR BLD AUTO: 0.2 % (ref 0–3)
ERYTHROCYTE [DISTWIDTH] IN BLOOD BY AUTOMATED COUNT: 16.3 % (ref 11.6–14.8)
GLOBULIN SER-MCNC: 4.1 G/DL
HCT VFR BLD CALC: 24.8 % (ref 42–52)
HGB BLD-MCNC: 8.2 G/DL (ref 14.2–18)
LYMPHOCYTES NFR BLD AUTO: 24.2 % (ref 20–45)
MCV RBC AUTO: 80 FL (ref 80–99)
MONOCYTES NFR BLD AUTO: 9.2 % (ref 1–10)
NEUTROPHILS NFR BLD AUTO: 65.9 % (ref 45–75)
PLATELET # BLD: 226 K/UL (ref 150–450)
POTASSIUM SERPL-SCNC: 4.1 MMOL/L (ref 3.5–5.1)
RBC # BLD AUTO: 3.08 M/UL (ref 4.7–6.1)
SODIUM SERPL-SCNC: 140 MMOL/L (ref 136–145)
WBC # BLD AUTO: 10.5 K/UL (ref 4.8–10.8)

## 2020-09-30 RX ADMIN — LEVETIRACETAM SCH MG: 100 SOLUTION ORAL at 20:18

## 2020-09-30 RX ADMIN — HEPARIN SODIUM SCH UNITS: 5000 INJECTION INTRAVENOUS; SUBCUTANEOUS at 09:10

## 2020-09-30 RX ADMIN — CHLORHEXIDINE GLUCONATE SCH APPLIC: 213 SOLUTION TOPICAL at 20:24

## 2020-09-30 RX ADMIN — SODIUM CHLORIDE AND POTASSIUM CHLORIDE SCH MLS/HR: 9; 1.49 INJECTION, SOLUTION INTRAVENOUS at 05:39

## 2020-09-30 RX ADMIN — MEROPENEM SCH MLS/HR: 1 INJECTION INTRAVENOUS at 05:39

## 2020-09-30 RX ADMIN — Medication SCH MG: at 18:09

## 2020-09-30 RX ADMIN — HEPARIN SODIUM SCH UNITS: 5000 INJECTION INTRAVENOUS; SUBCUTANEOUS at 20:20

## 2020-09-30 RX ADMIN — LEVETIRACETAM SCH MG: 100 SOLUTION ORAL at 09:07

## 2020-09-30 RX ADMIN — SODIUM CHLORIDE SCH MLS/HR: 0.9 INJECTION INTRAVENOUS at 20:19

## 2020-09-30 RX ADMIN — Medication SCH MG: at 09:08

## 2020-09-30 RX ADMIN — MEROPENEM SCH MLS/HR: 1 INJECTION INTRAVENOUS at 22:09

## 2020-09-30 RX ADMIN — MEROPENEM SCH MLS/HR: 1 INJECTION INTRAVENOUS at 13:30

## 2020-09-30 RX ADMIN — SODIUM CHLORIDE AND POTASSIUM CHLORIDE SCH MLS/HR: 9; 1.49 INJECTION, SOLUTION INTRAVENOUS at 14:30

## 2020-09-30 RX ADMIN — ZINC SULFATE CAP 220 MG (50 MG ELEMENTAL ZN) SCH MG: 220 (50 ZN) CAP at 09:08

## 2020-09-30 NOTE — GENERAL PROGRESS NOTE
Subjective


ROS Limited/Unobtainable:  Yes


Constitutional:  Reports: malaise, weakness


HEENT:  Reports: no symptoms


Cardiovascular:  Reports: no symptoms


Respiratory:  Reports: shortness of breath, sputum


Gastrointestinal/Abdominal:  Reports: difficulty swallowing


Genitourinary:  Reports: no symptoms


Neurologic/Psychiatric:  Reports: pre-existing deficit, seizure


Endocrine:  Reports: no symptoms


Hematologic/Lymphatic:  Reports: anemia


Allergies:  


Coded Allergies:  


     No Known Allergies (Unverified , 9/24/20)


All Systems:  reviewed and negative except above


Subjective


bp stable. remains on the vent. awake but confused. no fevers or chills. Abd us 

noted. HIDA ordered. on multiple iv abx.





Objective





Last 24 Hour Vital Signs








  Date Time  Temp Pulse Resp B/P (MAP) Pulse Ox O2 Delivery O2 Flow Rate FiO2


 


9/30/20 19:28  97 24     28


 


9/30/20 16:00        40


 


9/30/20 16:00 98.2 89 23 132/78 (96) 100   


 


9/30/20 16:00      Mechanical Ventilator  


 


9/30/20 16:00  108      


 


9/30/20 15:29  109 28     28


 


9/30/20 12:00        40


 


9/30/20 12:00  96      


 


9/30/20 12:00 98.6 95 24 139/86 (103) 100   


 


9/30/20 12:00      Mechanical Ventilator  


 


9/30/20 11:00  98 25     28


 


9/30/20 08:00      Mechanical Ventilator  


 


9/30/20 08:00        40


 


9/30/20 08:00 98.4 95 26 133/93 (106) 100   


 


9/30/20 08:00  98      


 


9/30/20 07:15  93 25     28


 


9/30/20 04:00 98.1 100 28 135/76 (95) 100   


 


9/30/20 04:00        40


 


9/30/20 04:00      Mechanical Ventilator  


 


9/30/20 04:00  93      


 


9/30/20 03:29  91 29     28


 


9/30/20 00:00        40


 


9/30/20 00:00      Mechanical Ventilator  


 


9/30/20 00:00 97.9 91 25 143/75 (97) 100   


 


9/30/20 00:00  93      


 


9/29/20 23:08  90 25     28

















Intake and Output  


 


 9/29/20 9/30/20





 19:00 07:00


 


Intake Total 1100 ml 1490 ml


 


Output Total 600 ml 1200 ml


 


Balance 500 ml 290 ml


 


  


 


Intake Free Water  150 ml


 


IV Total 1100 ml 1100 ml


 


Tube Feeding  240 ml


 


Output Urine Total 600 ml 1200 ml


 


# Bowel Movements  100








Laboratory Tests


9/30/20 03:00: 


White Blood Count 10.5, Red Blood Count 3.08L, Hemoglobin 8.2L, Hematocrit 24.8L

, Mean Corpuscular Volume 80, Mean Corpuscular Hemoglobin 26.7L, Mean 

Corpuscular Hemoglobin Concent 33.2, Red Cell Distribution Width 16.3H, Platelet

Count 226, Mean Platelet Volume 6.8, Neutrophils (%) (Auto) 65.9, Lymphocytes 

(%) (Auto) 24.2, Monocytes (%) (Auto) 9.2, Eosinophils (%) (Auto) 0.2, Basophils

(%) (Auto) 0.5, Sodium Level 140, Potassium Level 4.1, Chloride Level 111H, 

Carbon Dioxide Level 20L, Anion Gap 9, Blood Urea Nitrogen 4L, Creatinine 0.4L, 

Estimat Glomerular Filtration Rate > 60, Glucose Level 71L, Calcium Level 8.2L, 

Total Bilirubin 0.8, Aspartate Amino Transf (AST/SGOT) 44H, Alanine 

Aminotransferase (ALT/SGPT) 25, Alkaline Phosphatase 94, Total Protein 5.6L, 

Albumin 1.5L, Globulin 4.1, Albumin/Globulin Ratio 0.4L


Height (Feet):  5


Height (Inches):  10.00


Weight (Pounds):  200


Objective


General Appearance:  WD/WN, lethargic, confused


EENT:  normal ENT inspection


Neck:  non-tender, normal alignment


Cardiovascular:  normal peripheral pulses, normal rate, regular rhythm


Respiratory/Chest:  chest wall non-tender, lungs clear, normal breath sounds


Abdomen:  normal bowel sounds, non tender, soft, no organomegaly


Edema:  no edema noted Arm (L), no edema noted Arm (R)


Neurologic:  disoriented, unresponsive





Assessment/Plan


Problem List:  


(1) Pneumonia


ICD Codes:  J18.9 - Pneumonia, unspecified organism


SNOMED:  548260826


(2) Rapid atrial fibrillation


ICD Codes:  I48.91 - Unspecified atrial fibrillation


SNOMED:  288252011


(3) Sepsis


ICD Codes:  A41.9 - Sepsis, unspecified organism


SNOMED:  32752787


Status:  stable


Assessment/Plan:


monitor bp


wean as able


iv abx


follow up cultures


transfuse as needed


monitor for bleeding


replace lytes


PPI rx


replace lytes


HIDA scan


CT abd


guarded but improving


message left with sister re: status and poc











Brijesh Dewey MD              Sep 30, 2020 21:47

## 2020-09-30 NOTE — SURGERY PROGRESS NOTE
Surgery Progress Note


Subjective


Additional Comments


scalp lac stable


sutures sites since removal okay


labs noted





Objective





Last 24 Hour Vital Signs








  Date Time  Temp Pulse Resp B/P (MAP) Pulse Ox O2 Delivery O2 Flow Rate FiO2


 


9/30/20 12:00        40


 


9/30/20 12:00 98.6 95 24 139/86 (103) 100   


 


9/30/20 12:00      Mechanical Ventilator  


 


9/30/20 11:00  98 25     28


 


9/30/20 08:00      Mechanical Ventilator  


 


9/30/20 08:00        40


 


9/30/20 08:00 98.4 95 26 133/93 (106) 100   


 


9/30/20 08:00  98      


 


9/30/20 07:15  93 25     28


 


9/30/20 04:00 98.1 100 28 135/76 (95) 100   


 


9/30/20 04:00        40


 


9/30/20 04:00      Mechanical Ventilator  


 


9/30/20 04:00  93      


 


9/30/20 03:29  91 29     28


 


9/30/20 00:00        40


 


9/30/20 00:00      Mechanical Ventilator  


 


9/30/20 00:00 97.9 91 25 143/75 (97) 100   


 


9/30/20 00:00  93      


 


9/29/20 23:08  90 25     28


 


9/29/20 20:00 98.2 87 22 131/68 (89) 100   


 


9/29/20 20:00  95      


 


9/29/20 20:00      Mechanical Ventilator  


 


9/29/20 20:00        40


 


9/29/20 19:30  92 28     28


 


9/29/20 16:00  83      


 


9/29/20 16:00      Mechanical Ventilator  


 


9/29/20 16:00 97.9 63 20 133/67 (89) 99   


 


9/29/20 16:00        40


 


9/29/20 15:10  87 20     28








I&O











Intake and Output  


 


 9/29/20 9/30/20





 18:59 06:59


 


Intake Total 1000 ml 1470 ml


 


Output Total 600 ml 1200 ml


 


Balance 400 ml 270 ml


 


  


 


Intake Free Water  150 ml


 


IV Total 1000 ml 1100 ml


 


Tube Feeding  220 ml


 


Output Urine Total 600 ml 1200 ml


 


# Bowel Movements  100








Dressing:  saturated


Wound:  other


Cardiovascular:  RSR


Respiratory:  decreased breath sounds


Abdomen:  soft, non-tender, present bowel sounds


Extremities:  no edema, no tenderness, no cyanosis





Laboratory Tests








Test


 9/30/20


03:00


 


White Blood Count


 10.5 K/UL


(4.8-10.8)


 


Red Blood Count


 3.08 M/UL


(4.70-6.10)  L


 


Hemoglobin


 8.2 G/DL


(14.2-18.0)  L


 


Hematocrit


 24.8 %


(42.0-52.0)  L


 


Mean Corpuscular Volume 80 FL (80-99)  


 


Mean Corpuscular Hemoglobin


 26.7 PG


(27.0-31.0)  L


 


Mean Corpuscular Hemoglobin


Concent 33.2 G/DL


(32.0-36.0)


 


Red Cell Distribution Width


 16.3 %


(11.6-14.8)  H


 


Platelet Count


 226 K/UL


(150-450)


 


Mean Platelet Volume


 6.8 FL


(6.5-10.1)


 


Neutrophils (%) (Auto)


 65.9 %


(45.0-75.0)


 


Lymphocytes (%) (Auto)


 24.2 %


(20.0-45.0)


 


Monocytes (%) (Auto)


 9.2 %


(1.0-10.0)


 


Eosinophils (%) (Auto)


 0.2 %


(0.0-3.0)


 


Basophils (%) (Auto)


 0.5 %


(0.0-2.0)


 


Sodium Level


 140 MMOL/L


(136-145)


 


Potassium Level


 4.1 MMOL/L


(3.5-5.1)


 


Chloride Level


 111 MMOL/L


()  H


 


Carbon Dioxide Level


 20 MMOL/L


(21-32)  L


 


Anion Gap


 9 mmol/L


(5-15)


 


Blood Urea Nitrogen


 4 mg/dL (7-18)


L


 


Creatinine


 0.4 MG/DL


(0.55-1.30)  L


 


Estimat Glomerular Filtration


Rate > 60 mL/min


(>60)


 


Glucose Level


 71 MG/DL


()  L


 


Calcium Level


 8.2 MG/DL


(8.5-10.1)  L


 


Total Bilirubin


 0.8 MG/DL


(0.2-1.0)


 


Aspartate Amino Transf


(AST/SGOT) 44 U/L (15-37)


H


 


Alanine Aminotransferase


(ALT/SGPT) 25 U/L (12-78)





 


Alkaline Phosphatase


 94 U/L


()


 


Total Protein


 5.6 G/DL


(6.4-8.2)  L


 


Albumin


 1.5 G/DL


(3.4-5.0)  L


 


Globulin 4.1 g/dL  


 


Albumin/Globulin Ratio


 0.4 (1.0-2.7)


L











Plan


Problems:  


(1) Laceration of scalp


Assessment & Plan:  patient noted to have right occipital scalp laceration 5cm 

1mm dehiscence.  serous oozing.  no pus at this time.


--hold on staples or closure given secondary intention and age of wound   


staples noted to frontal right scalp 


staples removed


dressings applied to wound 


will monitor


no acute surgical intervention planned 





(2) Pneumonia


(3) Rapid atrial fibrillation


(4) Sepsis


Assessment & Plan:  leukocytosis


anemia


scalp lac with pus drainage prior now clear


abd wound with staples in place


pna


micro noted


on abx


labs improving


staples removed from scalp and abdomen


wounds evaluated and care plan initiated


will monitor


thank you 














Valentino Coleman                Sep 30, 2020 12:44

## 2020-10-01 VITALS — DIASTOLIC BLOOD PRESSURE: 61 MMHG | SYSTOLIC BLOOD PRESSURE: 118 MMHG

## 2020-10-01 VITALS — SYSTOLIC BLOOD PRESSURE: 127 MMHG | DIASTOLIC BLOOD PRESSURE: 70 MMHG

## 2020-10-01 VITALS — DIASTOLIC BLOOD PRESSURE: 70 MMHG | SYSTOLIC BLOOD PRESSURE: 139 MMHG

## 2020-10-01 VITALS — SYSTOLIC BLOOD PRESSURE: 127 MMHG | DIASTOLIC BLOOD PRESSURE: 77 MMHG

## 2020-10-01 VITALS — SYSTOLIC BLOOD PRESSURE: 142 MMHG | DIASTOLIC BLOOD PRESSURE: 76 MMHG

## 2020-10-01 VITALS — DIASTOLIC BLOOD PRESSURE: 66 MMHG | SYSTOLIC BLOOD PRESSURE: 131 MMHG

## 2020-10-01 LAB
ADD MANUAL DIFF: NO
ALBUMIN SERPL-MCNC: 1.5 G/DL (ref 3.4–5)
ALBUMIN/GLOB SERPL: 0.4 {RATIO} (ref 1–2.7)
ALP SERPL-CCNC: 88 U/L (ref 46–116)
ALT SERPL-CCNC: 22 U/L (ref 12–78)
ANION GAP SERPL CALC-SCNC: 10 MMOL/L (ref 5–15)
AST SERPL-CCNC: 50 U/L (ref 15–37)
BASOPHILS NFR BLD AUTO: 0.7 % (ref 0–2)
BILIRUB SERPL-MCNC: 0.5 MG/DL (ref 0.2–1)
BUN SERPL-MCNC: 4 MG/DL (ref 7–18)
CALCIUM SERPL-MCNC: 8.3 MG/DL (ref 8.5–10.1)
CHLORIDE SERPL-SCNC: 112 MMOL/L (ref 98–107)
CO2 SERPL-SCNC: 20 MMOL/L (ref 21–32)
CREAT SERPL-MCNC: 0.4 MG/DL (ref 0.55–1.3)
EOSINOPHIL NFR BLD AUTO: 0.2 % (ref 0–3)
ERYTHROCYTE [DISTWIDTH] IN BLOOD BY AUTOMATED COUNT: 16.6 % (ref 11.6–14.8)
GLOBULIN SER-MCNC: 3.6 G/DL
HCT VFR BLD CALC: 25 % (ref 42–52)
HGB BLD-MCNC: 8.3 G/DL (ref 14.2–18)
LYMPHOCYTES NFR BLD AUTO: 27.5 % (ref 20–45)
MCV RBC AUTO: 80 FL (ref 80–99)
MONOCYTES NFR BLD AUTO: 8.9 % (ref 1–10)
NEUTROPHILS NFR BLD AUTO: 62.8 % (ref 45–75)
PLATELET # BLD: 240 K/UL (ref 150–450)
POTASSIUM SERPL-SCNC: 4.3 MMOL/L (ref 3.5–5.1)
RBC # BLD AUTO: 3.11 M/UL (ref 4.7–6.1)
SODIUM SERPL-SCNC: 142 MMOL/L (ref 136–145)
WBC # BLD AUTO: 9.5 K/UL (ref 4.8–10.8)

## 2020-10-01 RX ADMIN — LEVETIRACETAM SCH MG: 100 SOLUTION ORAL at 08:55

## 2020-10-01 RX ADMIN — MEROPENEM SCH MLS/HR: 1 INJECTION INTRAVENOUS at 13:44

## 2020-10-01 RX ADMIN — CHLORHEXIDINE GLUCONATE SCH APPLIC: 213 SOLUTION TOPICAL at 20:29

## 2020-10-01 RX ADMIN — MEROPENEM SCH MLS/HR: 1 INJECTION INTRAVENOUS at 21:37

## 2020-10-01 RX ADMIN — MEROPENEM SCH MLS/HR: 1 INJECTION INTRAVENOUS at 05:35

## 2020-10-01 RX ADMIN — SODIUM CHLORIDE AND POTASSIUM CHLORIDE SCH MLS/HR: 9; 1.49 INJECTION, SOLUTION INTRAVENOUS at 20:33

## 2020-10-01 RX ADMIN — SODIUM CHLORIDE SCH MLS/HR: 0.9 INJECTION INTRAVENOUS at 20:29

## 2020-10-01 RX ADMIN — HEPARIN SODIUM SCH UNITS: 5000 INJECTION INTRAVENOUS; SUBCUTANEOUS at 08:59

## 2020-10-01 RX ADMIN — SODIUM CHLORIDE AND POTASSIUM CHLORIDE SCH MLS/HR: 9; 1.49 INJECTION, SOLUTION INTRAVENOUS at 21:40

## 2020-10-01 RX ADMIN — LEVETIRACETAM SCH MG: 100 SOLUTION ORAL at 20:29

## 2020-10-01 RX ADMIN — ZINC SULFATE CAP 220 MG (50 MG ELEMENTAL ZN) SCH MG: 220 (50 ZN) CAP at 08:55

## 2020-10-01 RX ADMIN — Medication SCH MG: at 17:53

## 2020-10-01 RX ADMIN — SODIUM CHLORIDE AND POTASSIUM CHLORIDE SCH MLS/HR: 9; 1.49 INJECTION, SOLUTION INTRAVENOUS at 01:12

## 2020-10-01 RX ADMIN — HEPARIN SODIUM SCH UNITS: 5000 INJECTION INTRAVENOUS; SUBCUTANEOUS at 20:32

## 2020-10-01 RX ADMIN — Medication SCH MG: at 08:55

## 2020-10-01 RX ADMIN — SODIUM CHLORIDE AND POTASSIUM CHLORIDE SCH MLS/HR: 9; 1.49 INJECTION, SOLUTION INTRAVENOUS at 10:30

## 2020-10-01 NOTE — CARDIOLOGY PROGRESS NOTE
Subjective


DATE OF SERVICE:  Sep 30, 2020





Had been on pressor support for hypotension; now tapered off.


Low hemoglobin, K+, and Mg++ repleted as needed





Objective





Last 24 Hour Vital Signs








  Date Time  Temp Pulse Resp B/P (MAP) Pulse Ox O2 Delivery O2 Flow Rate FiO2


 


10/1/20 00:00 98.2 88 20 131/66 (87) 100   


 


10/1/20 00:00      Mechanical Ventilator  


 


9/30/20 23:29  95      


 


9/30/20 23:04  96 28     28


 


9/30/20 20:00 98.7 94 20 119/70 (86) 100   


 


9/30/20 20:00      Mechanical Ventilator  


 


9/30/20 20:00        40


 


9/30/20 19:28  97 24     28


 


9/30/20 19:02  89      


 


9/30/20 16:00        40


 


9/30/20 16:00 98.2 89 23 132/78 (96) 100   


 


9/30/20 16:00      Mechanical Ventilator  


 


9/30/20 16:00  108      


 


9/30/20 15:29  109 28     28


 


9/30/20 12:00        40


 


9/30/20 12:00  96      


 


9/30/20 12:00 98.6 95 24 139/86 (103) 100   


 


9/30/20 12:00      Mechanical Ventilator  


 


9/30/20 11:00  98 25     28


 


9/30/20 08:00      Mechanical Ventilator  


 


9/30/20 08:00        40


 


9/30/20 08:00 98.4 95 26 133/93 (106) 100   


 


9/30/20 08:00  98      


 


9/30/20 07:15  93 25     28


 


9/30/20 04:00 98.1 100 28 135/76 (95) 100   


 


9/30/20 04:00        40


 


9/30/20 04:00      Mechanical Ventilator  


 


9/30/20 04:00  93      


 


9/30/20 03:29  91 29     28








ROS: unchanged from my note of 9/24/20.


HEENT:  Thin Trach secretions


RHYTHM:  Afib


LUNGS:  bilateral rhonchi


CARDIAC:  irregularly irregular


ABDOMEN:  non tender, soft, distended, G-Tube intact


EXTREMITIES:  trace edema, other - Neuro: comatose state





Laboratory Tests








Test


 9/30/20


03:00


 


White Blood Count


 10.5 K/UL


(4.8-10.8)


 


Red Blood Count


 3.08 M/UL


(4.70-6.10)  L


 


Hemoglobin


 8.2 G/DL


(14.2-18.0)  L


 


Hematocrit


 24.8 %


(42.0-52.0)  L


 


Mean Corpuscular Volume 80 FL (80-99)  


 


Mean Corpuscular Hemoglobin


 26.7 PG


(27.0-31.0)  L


 


Mean Corpuscular Hemoglobin


Concent 33.2 G/DL


(32.0-36.0)


 


Red Cell Distribution Width


 16.3 %


(11.6-14.8)  H


 


Platelet Count


 226 K/UL


(150-450)


 


Mean Platelet Volume


 6.8 FL


(6.5-10.1)


 


Neutrophils (%) (Auto)


 65.9 %


(45.0-75.0)


 


Lymphocytes (%) (Auto)


 24.2 %


(20.0-45.0)


 


Monocytes (%) (Auto)


 9.2 %


(1.0-10.0)


 


Eosinophils (%) (Auto)


 0.2 %


(0.0-3.0)


 


Basophils (%) (Auto)


 0.5 %


(0.0-2.0)


 


Sodium Level


 140 MMOL/L


(136-145)


 


Potassium Level


 4.1 MMOL/L


(3.5-5.1)


 


Chloride Level


 111 MMOL/L


()  H


 


Carbon Dioxide Level


 20 MMOL/L


(21-32)  L


 


Anion Gap


 9 mmol/L


(5-15)


 


Blood Urea Nitrogen


 4 mg/dL (7-18)


L


 


Creatinine


 0.4 MG/DL


(0.55-1.30)  L


 


Estimat Glomerular Filtration


Rate > 60 mL/min


(>60)


 


Glucose Level


 71 MG/DL


()  L


 


Calcium Level


 8.2 MG/DL


(8.5-10.1)  L


 


Total Bilirubin


 0.8 MG/DL


(0.2-1.0)


 


Aspartate Amino Transf


(AST/SGOT) 44 U/L (15-37)


H


 


Alanine Aminotransferase


(ALT/SGPT) 25 U/L (12-78)





 


Alkaline Phosphatase


 94 U/L


()


 


Total Protein


 5.6 G/DL


(6.4-8.2)  L


 


Albumin


 1.5 G/DL


(3.4-5.0)  L


 


Globulin 4.1 g/dL  


 


Albumin/Globulin Ratio


 0.4 (1.0-2.7)


L











Assessment/Plan


Assessment/Plan


Sepsis with shock


Respiratory failure


Chronic vegetative state


Anemia


Hypokalemia - total body depl


Hypomagnesemia - severe


Lactic acidosis


PAFib with RVR














Abx


Resp support


IVF hydration


Hold carvedilol and amlodipine until BP parameters warrent resumption.


PRBC transfusion as needed.


Replace IV Mg++ and K+ as needed











Jamie Jo MD                 Oct 1, 2020 02:47

## 2020-10-01 NOTE — DIAGNOSTIC IMAGING REPORT
Indications: Abnormal liver function tests

 

Technique: IV administration 6.7 mCi 99 M technetium Choletec. Serial images obtained

over the abdomen for one hour

 

Comparison: No comparison sonograms. Reference made to abdominal ultrasound 9/25/2020

 

Findings: Prompt tracer uptake within the liver. Extrahepatic bile ducts are seen at

10 minutes. Excretion into the duodenum demonstrated at 10 minutes.  Gallbladder

visualized at   40 minutes.

 

Impression: Negative. No evidence of cystic duct or common bile duct obstruction

## 2020-10-01 NOTE — SURGERY PROGRESS NOTE
Surgery Progress Note


Subjective


Additional Comments


theresa cute events


comfortable





Objective





Last 24 Hour Vital Signs








  Date Time  Temp Pulse Resp B/P (MAP) Pulse Ox O2 Delivery O2 Flow Rate FiO2


 


10/1/20 11:06  88 20     28


 


10/1/20 08:00 98.2 94 22 127/77 (94) 100   


 


10/1/20 08:00        40


 


10/1/20 08:00      Mechanical Ventilator  


 


10/1/20 08:00  92      


 


10/1/20 07:20  92 24     28


 


10/1/20 04:00      Mechanical Ventilator  


 


10/1/20 04:00 98.8 97 16 142/76 (98) 100   


 


10/1/20 04:00        40


 


10/1/20 03:30  91      


 


10/1/20 03:22  99 25     28


 


10/1/20 00:00 98.2 88 20 131/66 (87) 100   


 


10/1/20 00:00      Mechanical Ventilator  


 


9/30/20 23:29  95      


 


9/30/20 23:04  96 28     28


 


9/30/20 20:00 98.7 94 20 119/70 (86) 100   


 


9/30/20 20:00      Mechanical Ventilator  


 


9/30/20 20:00        40


 


9/30/20 19:28  97 24     28


 


9/30/20 19:02  89      


 


9/30/20 16:00        40


 


9/30/20 16:00 98.2 89 23 132/78 (96) 100   


 


9/30/20 16:00      Mechanical Ventilator  


 


9/30/20 16:00  108      


 


9/30/20 15:29  109 28     28








I&O











Intake and Output  


 


 9/30/20 10/1/20





 19:00 07:00


 


Intake Total 255 ml 1995 ml


 


Output Total  1200 ml


 


Balance 255 ml 795 ml


 


  


 


Intake Free Water 115 ml 150 ml


 


IV Total 100 ml 1445 ml


 


Tube Feeding 40 ml 400 ml


 


Output Urine Total  1200 ml


 


# Bowel Movements  300








Dressing:  saturated


Cardiovascular:  RSR


Respiratory:  decreased breath sounds


Abdomen:  soft, non-tender, present bowel sounds


Extremities:  no tenderness, no cyanosis





Laboratory Tests








Test


 10/1/20


02:45


 


White Blood Count


 9.5 K/UL


(4.8-10.8)


 


Red Blood Count


 3.11 M/UL


(4.70-6.10)  L


 


Hemoglobin


 8.3 G/DL


(14.2-18.0)  L


 


Hematocrit


 25.0 %


(42.0-52.0)  L


 


Mean Corpuscular Volume 80 FL (80-99)  


 


Mean Corpuscular Hemoglobin


 26.8 PG


(27.0-31.0)  L


 


Mean Corpuscular Hemoglobin


Concent 33.3 G/DL


(32.0-36.0)


 


Red Cell Distribution Width


 16.6 %


(11.6-14.8)  H


 


Platelet Count


 240 K/UL


(150-450)


 


Mean Platelet Volume


 6.5 FL


(6.5-10.1)


 


Neutrophils (%) (Auto)


 62.8 %


(45.0-75.0)


 


Lymphocytes (%) (Auto)


 27.5 %


(20.0-45.0)


 


Monocytes (%) (Auto)


 8.9 %


(1.0-10.0)


 


Eosinophils (%) (Auto)


 0.2 %


(0.0-3.0)


 


Basophils (%) (Auto)


 0.7 %


(0.0-2.0)


 


Sodium Level


 142 MMOL/L


(136-145)


 


Potassium Level


 4.3 MMOL/L


(3.5-5.1)


 


Chloride Level


 112 MMOL/L


()  H


 


Carbon Dioxide Level


 20 MMOL/L


(21-32)  L


 


Anion Gap


 10 mmol/L


(5-15)


 


Blood Urea Nitrogen


 4 mg/dL (7-18)


L


 


Creatinine


 0.4 MG/DL


(0.55-1.30)  L


 


Estimat Glomerular Filtration


Rate > 60 mL/min


(>60)


 


Glucose Level


 84 MG/DL


()


 


Calcium Level


 8.3 MG/DL


(8.5-10.1)  L


 


Total Bilirubin


 0.5 MG/DL


(0.2-1.0)


 


Aspartate Amino Transf


(AST/SGOT) 50 U/L (15-37)


H


 


Alanine Aminotransferase


(ALT/SGPT) 22 U/L (12-78)





 


Alkaline Phosphatase


 88 U/L


()


 


Total Protein


 5.1 G/DL


(6.4-8.2)  L


 


Albumin


 1.5 G/DL


(3.4-5.0)  L


 


Globulin 3.6 g/dL  


 


Albumin/Globulin Ratio


 0.4 (1.0-2.7)


L











Plan


Problems:  


(1) Laceration of scalp


Assessment & Plan:  patient noted to have right occipital scalp laceration 5cm 

1mm dehiscence.  serous oozing.  no pus at this time.


--hold on staples or closure given secondary intention and age of wound   


staples noted to frontal right scalp 


staples removed


dressings applied to wound 


will monitor


no acute surgical intervention planned 





(2) Pneumonia


(3) Rapid atrial fibrillation


(4) Sepsis


Assessment & Plan:  leukocytosis


anemia


scalp lac with pus drainage prior now clear


abd wound with staples in place


pna


micro noted


on abx


labs improving


staples removed from scalp and abdomen


wounds evaluated and care plan initiated


will monitor


thank you 














Valentino Coleman                 Oct 1, 2020 12:16

## 2020-10-01 NOTE — INFECTIOUS DISEASES PROG NOTE
Assessment/Plan


Assessment/Plan


A;


1. Serratia sepsis,


2. E. coli urinary tract infection.


3. Seizures.


4. Respiratory failure, status post tracheostomy.


5. leukocytosis.


6. Septic shock.


7. Anemia


8. Diarrhea


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








PLAN:


1. Continue meropenem.


2. We will follow up HIDA scan





Subjective


ROS Limited/Unobtainable:  Yes


Constitutional:  Denies: fever


Allergies:  


Coded Allergies:  


     No Known Allergies (Unverified , 9/24/20)





Objective





Last 24 Hour Vital Signs








  Date Time  Temp Pulse Resp B/P (MAP) Pulse Ox O2 Delivery O2 Flow Rate FiO2


 


10/1/20 12:00 98.1 90 23 139/70 (93) 100   


 


10/1/20 12:00  94      


 


10/1/20 12:00      Mechanical Ventilator  


 


10/1/20 12:00        40


 


10/1/20 11:06  88 20     28


 


10/1/20 08:00 98.2 94 22 127/77 (94) 100   


 


10/1/20 08:00        40


 


10/1/20 08:00      Mechanical Ventilator  


 


10/1/20 08:00  92      


 


10/1/20 07:20  92 24     28


 


10/1/20 04:00      Mechanical Ventilator  


 


10/1/20 04:00 98.8 97 16 142/76 (98) 100   


 


10/1/20 04:00        40


 


10/1/20 03:30  91      


 


10/1/20 03:22  99 25     28


 


10/1/20 00:00 98.2 88 20 131/66 (87) 100   


 


10/1/20 00:00      Mechanical Ventilator  


 


9/30/20 23:29  95      


 


9/30/20 23:04  96 28     28


 


9/30/20 20:00 98.7 94 20 119/70 (86) 100   


 


9/30/20 20:00      Mechanical Ventilator  


 


9/30/20 20:00        40


 


9/30/20 19:28  97 24     28


 


9/30/20 19:02  89      


 


9/30/20 16:00        40


 


9/30/20 16:00 98.2 89 23 132/78 (96) 100   


 


9/30/20 16:00      Mechanical Ventilator  


 


9/30/20 16:00  108      


 


9/30/20 15:29  109 28     28








Height (Feet):  5


Height (Inches):  10.00


Weight (Pounds):  200


General Appearance:  no acute distress


HEENT:  status post trach


Respiratory/Chest:  decreased breath sounds, other - on ventilator


Cardiovascular:  normal rate, other - R arm PICC line


Abdomen:  soft, non tender, other - GT feeding


Extremities:  other - edema of arms


Skin:  ulcers, other - Pressure ulcers





Laboratory Tests








Test


 10/1/20


02:45


 


White Blood Count


 9.5 K/UL


(4.8-10.8)


 


Red Blood Count


 3.11 M/UL


(4.70-6.10)  L


 


Hemoglobin


 8.3 G/DL


(14.2-18.0)  L


 


Hematocrit


 25.0 %


(42.0-52.0)  L


 


Mean Corpuscular Volume 80 FL (80-99)  


 


Mean Corpuscular Hemoglobin


 26.8 PG


(27.0-31.0)  L


 


Mean Corpuscular Hemoglobin


Concent 33.3 G/DL


(32.0-36.0)


 


Red Cell Distribution Width


 16.6 %


(11.6-14.8)  H


 


Platelet Count


 240 K/UL


(150-450)


 


Mean Platelet Volume


 6.5 FL


(6.5-10.1)


 


Neutrophils (%) (Auto)


 62.8 %


(45.0-75.0)


 


Lymphocytes (%) (Auto)


 27.5 %


(20.0-45.0)


 


Monocytes (%) (Auto)


 8.9 %


(1.0-10.0)


 


Eosinophils (%) (Auto)


 0.2 %


(0.0-3.0)


 


Basophils (%) (Auto)


 0.7 %


(0.0-2.0)


 


Sodium Level


 142 MMOL/L


(136-145)


 


Potassium Level


 4.3 MMOL/L


(3.5-5.1)


 


Chloride Level


 112 MMOL/L


()  H


 


Carbon Dioxide Level


 20 MMOL/L


(21-32)  L


 


Anion Gap


 10 mmol/L


(5-15)


 


Blood Urea Nitrogen


 4 mg/dL (7-18)


L


 


Creatinine


 0.4 MG/DL


(0.55-1.30)  L


 


Estimat Glomerular Filtration


Rate > 60 mL/min


(>60)


 


Glucose Level


 84 MG/DL


()


 


Calcium Level


 8.3 MG/DL


(8.5-10.1)  L


 


Total Bilirubin


 0.5 MG/DL


(0.2-1.0)


 


Aspartate Amino Transf


(AST/SGOT) 50 U/L (15-37)


H


 


Alanine Aminotransferase


(ALT/SGPT) 22 U/L (12-78)





 


Alkaline Phosphatase


 88 U/L


()


 


Total Protein


 5.1 G/DL


(6.4-8.2)  L


 


Albumin


 1.5 G/DL


(3.4-5.0)  L


 


Globulin 3.6 g/dL  


 


Albumin/Globulin Ratio


 0.4 (1.0-2.7)


L











Current Medications








 Medications


  (Trade)  Dose


 Ordered  Sig/Tony


 Route


 PRN Reason  Start Time


 Stop Time Status Last Admin


Dose Admin


 


 Acetaminophen


  (Tylenol)  650 mg  Q6H  PRN


 GT


 Mild Pain (Pain Scale 1-3)  9/24/20 18:45


 10/24/20 18:44  9/24/20 21:39





 


 Acetaminophen


  (Tylenol)  650 mg  Q6H  PRN


 GT


 Temp >100.5  9/24/20 19:15


 10/24/20 19:14  9/25/20 17:13





 


 Ascorbic Acid


  (Vitamin C)  500 mg  TWICE A  DAY


 GT


   9/25/20 09:00


 10/25/20 08:59  10/1/20 08:55





 


 Bisacodyl


  (Dulcolax)  10 mg  DAILYPRN  PRN


 RECTAL


 Constipation  9/24/20 18:45


 12/23/20 18:44   





 


 Chlorhexidine


 Gluconate


  (Holli-Hex 2%)  1 applic  DAILY@2000


 TOPIC


   9/25/20 20:00


 12/24/20 19:59  9/30/20 20:24





 


 Diphenoxylate HCl/


 Atropine


  (Lomotil)  2.5 mg  Q4H  PRN


 GT


 Diarrhea  9/28/20 07:15


 10/28/20 07:14   





 


 Famotidine


  (Pepcid)  20 mg  TWICE A  DAY


 GT


   9/24/20 18:45


 12/23/20 18:44  10/1/20 08:55





 


 Heparin Sodium


  (Porcine)


  (Heparin 5000


 units/ml)  5,000 units  EVERY 12  HOURS


 SUBQ


   9/24/20 21:00


 11/8/20 20:59  10/1/20 08:59





 


 Iron Sucrose 100


 mg/Sodium Chloride  60 ml @ 


 240 mls/hr  BEDTIME


 IV


   9/28/20 21:00


 10/2/20 21:14  9/30/20 20:19





 


 Levetiracetam


  (Keppra)  1,000 mg  EVERY 12  HOURS


 GT


   9/24/20 21:00


 10/24/20 20:59  10/1/20 08:55





 


 Meropenem 1 gm/


 Sodium Chloride  55 ml @ 


 110 mls/hr  Q8HR


 IVPB


   9/30/20 14:00


 10/5/20 13:59  10/1/20 05:35





 


 Potassium


 Chloride/Sodium


 Chloride  1,000 ml @ 


 100 mls/hr  Q10H


 IV


   9/26/20 00:30


 10/26/20 00:29  10/1/20 10:30





 


 Zinc Sulfate


  (Zinc Sulfate)  220 mg  DAILY


 GT


   9/24/20 21:00


 12/23/20 20:59  10/1/20 08:55




















Harry Snow MD                Oct 1, 2020 13:38

## 2020-10-01 NOTE — CARDIOLOGY PROGRESS NOTE
Subjective


DATE OF SERVICE:  Oct 1, 2020





Remains off pressors.


Low hemoglobin, K+, and Mg++ repleted as needed


HIDA scan negative





Objective





Last 24 Hour Vital Signs








  Date Time  Temp Pulse Resp B/P (MAP) Pulse Ox O2 Delivery O2 Flow Rate FiO2


 


10/1/20 20:00        40


 


10/1/20 20:00      Mechanical Ventilator  


 


10/1/20 20:00 98.2 96 21 118/61 (80) 100   


 


10/1/20 19:50  104 21     28


 


10/1/20 19:43  105      


 


10/1/20 16:00        40


 


10/1/20 16:00 98.1 92 22 127/70 (89) 100   


 


10/1/20 16:00      Mechanical Ventilator  


 


10/1/20 16:00  86      


 


10/1/20 15:16  88 21     28


 


10/1/20 12:00 98.1 90 23 139/70 (93) 100   


 


10/1/20 12:00  94      


 


10/1/20 12:00      Mechanical Ventilator  


 


10/1/20 12:00        40


 


10/1/20 11:06  88 20     28


 


10/1/20 08:00 98.2 94 22 127/77 (94) 100   


 


10/1/20 08:00        40


 


10/1/20 08:00      Mechanical Ventilator  


 


10/1/20 08:00  92      


 


10/1/20 07:20  92 24     28


 


10/1/20 04:00      Mechanical Ventilator  


 


10/1/20 04:00 98.8 97 16 142/76 (98) 100   


 


10/1/20 04:00        40


 


10/1/20 03:30  91      


 


10/1/20 03:22  99 25     28


 


10/1/20 00:00 98.2 88 20 131/66 (87) 100   


 


10/1/20 00:00      Mechanical Ventilator  


 


9/30/20 23:29  95      








ROS: unchanged from my note of 9/24/20.


HEENT:  Thin Trach secretions


RHYTHM:  Afib


LUNGS:  bilateral rhonchi


CARDIAC:  irregularly irregular


ABDOMEN:  non tender, soft, distended, G-Tube intact


EXTREMITIES:  trace edema, other - Neuro: comatose state





Laboratory Tests








Test


 10/1/20


02:45


 


White Blood Count


 9.5 K/UL


(4.8-10.8)


 


Red Blood Count


 3.11 M/UL


(4.70-6.10)  L


 


Hemoglobin


 8.3 G/DL


(14.2-18.0)  L


 


Hematocrit


 25.0 %


(42.0-52.0)  L


 


Mean Corpuscular Volume 80 FL (80-99)  


 


Mean Corpuscular Hemoglobin


 26.8 PG


(27.0-31.0)  L


 


Mean Corpuscular Hemoglobin


Concent 33.3 G/DL


(32.0-36.0)


 


Red Cell Distribution Width


 16.6 %


(11.6-14.8)  H


 


Platelet Count


 240 K/UL


(150-450)


 


Mean Platelet Volume


 6.5 FL


(6.5-10.1)


 


Neutrophils (%) (Auto)


 62.8 %


(45.0-75.0)


 


Lymphocytes (%) (Auto)


 27.5 %


(20.0-45.0)


 


Monocytes (%) (Auto)


 8.9 %


(1.0-10.0)


 


Eosinophils (%) (Auto)


 0.2 %


(0.0-3.0)


 


Basophils (%) (Auto)


 0.7 %


(0.0-2.0)


 


Sodium Level


 142 MMOL/L


(136-145)


 


Potassium Level


 4.3 MMOL/L


(3.5-5.1)


 


Chloride Level


 112 MMOL/L


()  H


 


Carbon Dioxide Level


 20 MMOL/L


(21-32)  L


 


Anion Gap


 10 mmol/L


(5-15)


 


Blood Urea Nitrogen


 4 mg/dL (7-18)


L


 


Creatinine


 0.4 MG/DL


(0.55-1.30)  L


 


Estimat Glomerular Filtration


Rate > 60 mL/min


(>60)


 


Glucose Level


 84 MG/DL


()


 


Calcium Level


 8.3 MG/DL


(8.5-10.1)  L


 


Total Bilirubin


 0.5 MG/DL


(0.2-1.0)


 


Aspartate Amino Transf


(AST/SGOT) 50 U/L (15-37)


H


 


Alanine Aminotransferase


(ALT/SGPT) 22 U/L (12-78)





 


Alkaline Phosphatase


 88 U/L


()


 


Total Protein


 5.1 G/DL


(6.4-8.2)  L


 


Albumin


 1.5 G/DL


(3.4-5.0)  L


 


Globulin 3.6 g/dL  


 


Albumin/Globulin Ratio


 0.4 (1.0-2.7)


L











Assessment/Plan


Assessment/Plan


Sepsis with shock


Respiratory failure


Chronic vegetative state


Anemia


Hypokalemia - total body depl


Hypomagnesemia - severe


Lactic acidosis


PAFib with RVR














Abx


Resp support


IVF hydration discont'd; reassess for diuretics on a daily basis.


Hold amlodipine until BP parameters warrent resumption.  Will resume carvedilol 

now.


PRBC transfusion as needed.


Replace IV Mg++ and K+ as needed











Jamie Jo MD                 Oct 1, 2020 23:23

## 2020-10-01 NOTE — GENERAL PROGRESS NOTE
Subjective


ROS Limited/Unobtainable:  Yes


Constitutional:  Reports: malaise, weakness


HEENT:  Reports: no symptoms


Cardiovascular:  Reports: edema


Respiratory:  Reports: shortness of breath, sputum


Gastrointestinal/Abdominal:  Reports: difficulty swallowing


Genitourinary:  Reports: no symptoms


Neurologic/Psychiatric:  Reports: pre-existing deficit, seizure


Endocrine:  Reports: no symptoms


Hematologic/Lymphatic:  Reports: anemia


Allergies:  


Coded Allergies:  


     No Known Allergies (Unverified , 9/24/20)


All Systems:  reviewed and negative except above


Subjective


bp stable. remains on the vent. awake but confused. does not track or follow 

commands. no fevers or chills. Abd us noted. HIDA ordered. on multiple iv abx.





Objective





Last 24 Hour Vital Signs








  Date Time  Temp Pulse Resp B/P (MAP) Pulse Ox O2 Delivery O2 Flow Rate FiO2


 


10/1/20 15:16  88 21     28


 


10/1/20 12:00 98.1 90 23 139/70 (93) 100   


 


10/1/20 12:00  94      


 


10/1/20 12:00      Mechanical Ventilator  


 


10/1/20 12:00        40


 


10/1/20 11:06  88 20     28


 


10/1/20 08:00 98.2 94 22 127/77 (94) 100   


 


10/1/20 08:00        40


 


10/1/20 08:00      Mechanical Ventilator  


 


10/1/20 08:00  92      


 


10/1/20 07:20  92 24     28


 


10/1/20 04:00      Mechanical Ventilator  


 


10/1/20 04:00 98.8 97 16 142/76 (98) 100   


 


10/1/20 04:00        40


 


10/1/20 03:30  91      


 


10/1/20 03:22  99 25     28


 


10/1/20 00:00 98.2 88 20 131/66 (87) 100   


 


10/1/20 00:00      Mechanical Ventilator  


 


9/30/20 23:29  95      


 


9/30/20 23:04  96 28     28


 


9/30/20 20:00 98.7 94 20 119/70 (86) 100   


 


9/30/20 20:00      Mechanical Ventilator  


 


9/30/20 20:00        40


 


9/30/20 19:28  97 24     28


 


9/30/20 19:02  89      


 


9/30/20 16:00        40


 


9/30/20 16:00 98.2 89 23 132/78 (96) 100   


 


9/30/20 16:00      Mechanical Ventilator  


 


9/30/20 16:00  108      

















Intake and Output  


 


 9/30/20 10/1/20





 19:00 07:00


 


Intake Total 255 ml 1995 ml


 


Output Total  1200 ml


 


Balance 255 ml 795 ml


 


  


 


Intake Free Water 115 ml 150 ml


 


IV Total 100 ml 1445 ml


 


Tube Feeding 40 ml 400 ml


 


Output Urine Total  1200 ml


 


# Bowel Movements  300








Laboratory Tests


10/1/20 02:45: 


White Blood Count 9.5, Red Blood Count 3.11L, Hemoglobin 8.3L, Hematocrit 25.0L,

Mean Corpuscular Volume 80, Mean Corpuscular Hemoglobin 26.8L, Mean Corpuscular 

Hemoglobin Concent 33.3, Red Cell Distribution Width 16.6H, Platelet Count 240, 

Mean Platelet Volume 6.5, Neutrophils (%) (Auto) 62.8, Lymphocytes (%) (Auto) 

27.5, Monocytes (%) (Auto) 8.9, Eosinophils (%) (Auto) 0.2, Basophils (%) (Auto)

0.7, Sodium Level 142, Potassium Level 4.3, Chloride Level 112H, Carbon Dioxide 

Level 20L, Anion Gap 10, Blood Urea Nitrogen 4L, Creatinine 0.4L, Estimat 

Glomerular Filtration Rate > 60, Glucose Level 84, Calcium Level 8.3L, Total 

Bilirubin 0.5, Aspartate Amino Transf (AST/SGOT) 50H, Alanine Aminotransferase 

(ALT/SGPT) 22, Alkaline Phosphatase 88, Total Protein 5.1L, Albumin 1.5L, 

Globulin 3.6, Albumin/Globulin Ratio 0.4L


Height (Feet):  5


Height (Inches):  10.00


Weight (Pounds):  200


Objective


General Appearance:  WD/WN, lethargic, confused


EENT:  normal ENT inspection


Neck:  non-tender, normal alignment


Cardiovascular:  normal peripheral pulses, normal rate, regular rhythm


Respiratory/Chest:  chest wall non-tender, lungs clear, normal breath sounds


Abdomen:  normal bowel sounds, non tender, soft, no organomegaly


Edema: generalized edema


Neurologic:  disoriented, unresponsive





Assessment/Plan


Problem List:  


(1) Pneumonia


ICD Codes:  J18.9 - Pneumonia, unspecified organism


SNOMED:  698214441


(2) Rapid atrial fibrillation


ICD Codes:  I48.91 - Unspecified atrial fibrillation


SNOMED:  489751740


(3) Sepsis


ICD Codes:  A41.9 - Sepsis, unspecified organism


SNOMED:  [id number]


Status:  stable


Assessment/Plan:


monitor bp


wean as able


iv abx


follow up cultures


transfuse as needed


monitor for bleeding


replace lytes


PPI rx


replace lytes


HIDA scan


CT abd


guarded but improving


message left with sister re: status and poc











Brijesh Dewey MD               Oct 1, 2020 15:32

## 2020-10-02 VITALS — SYSTOLIC BLOOD PRESSURE: 147 MMHG | DIASTOLIC BLOOD PRESSURE: 83 MMHG

## 2020-10-02 VITALS — DIASTOLIC BLOOD PRESSURE: 69 MMHG | SYSTOLIC BLOOD PRESSURE: 119 MMHG

## 2020-10-02 VITALS — SYSTOLIC BLOOD PRESSURE: 120 MMHG | DIASTOLIC BLOOD PRESSURE: 65 MMHG

## 2020-10-02 VITALS — DIASTOLIC BLOOD PRESSURE: 61 MMHG | SYSTOLIC BLOOD PRESSURE: 115 MMHG

## 2020-10-02 VITALS — SYSTOLIC BLOOD PRESSURE: 119 MMHG | DIASTOLIC BLOOD PRESSURE: 74 MMHG

## 2020-10-02 VITALS — DIASTOLIC BLOOD PRESSURE: 68 MMHG | SYSTOLIC BLOOD PRESSURE: 137 MMHG

## 2020-10-02 LAB
ANION GAP SERPL CALC-SCNC: 11 MMOL/L (ref 5–15)
BUN SERPL-MCNC: 5 MG/DL (ref 7–18)
CALCIUM SERPL-MCNC: 8 MG/DL (ref 8.5–10.1)
CHLORIDE SERPL-SCNC: 111 MMOL/L (ref 98–107)
CO2 SERPL-SCNC: 24 MMOL/L (ref 21–32)
CREAT SERPL-MCNC: 0.4 MG/DL (ref 0.55–1.3)
POTASSIUM SERPL-SCNC: 3.8 MMOL/L (ref 3.5–5.1)
SODIUM SERPL-SCNC: 146 MMOL/L (ref 136–145)

## 2020-10-02 RX ADMIN — MEROPENEM SCH MLS/HR: 1 INJECTION INTRAVENOUS at 05:00

## 2020-10-02 RX ADMIN — CARVEDILOL SCH MG: 12.5 TABLET, FILM COATED ORAL at 08:09

## 2020-10-02 RX ADMIN — MEROPENEM SCH MLS/HR: 1 INJECTION INTRAVENOUS at 22:32

## 2020-10-02 RX ADMIN — CHLORHEXIDINE GLUCONATE SCH APPLIC: 213 SOLUTION TOPICAL at 20:17

## 2020-10-02 RX ADMIN — Medication SCH MG: at 08:03

## 2020-10-02 RX ADMIN — LEVETIRACETAM SCH MG: 100 SOLUTION ORAL at 08:05

## 2020-10-02 RX ADMIN — LEVETIRACETAM SCH MG: 100 SOLUTION ORAL at 20:13

## 2020-10-02 RX ADMIN — MEROPENEM SCH MLS/HR: 1 INJECTION INTRAVENOUS at 14:55

## 2020-10-02 RX ADMIN — HEPARIN SODIUM SCH UNITS: 5000 INJECTION INTRAVENOUS; SUBCUTANEOUS at 08:07

## 2020-10-02 RX ADMIN — HEPARIN SODIUM SCH UNITS: 5000 INJECTION INTRAVENOUS; SUBCUTANEOUS at 20:15

## 2020-10-02 RX ADMIN — DIPHENOXYLATE HYDROCHLORIDE AND ATROPINE SULFATE PRN MG: 2.5; .025 TABLET ORAL at 08:04

## 2020-10-02 RX ADMIN — Medication SCH MG: at 18:17

## 2020-10-02 RX ADMIN — CARVEDILOL SCH MG: 12.5 TABLET, FILM COATED ORAL at 20:12

## 2020-10-02 RX ADMIN — SODIUM CHLORIDE SCH MLS/HR: 0.9 INJECTION INTRAVENOUS at 20:16

## 2020-10-02 RX ADMIN — ZINC SULFATE CAP 220 MG (50 MG ELEMENTAL ZN) SCH MG: 220 (50 ZN) CAP at 08:02

## 2020-10-02 NOTE — SURGERY PROGRESS NOTE
Surgery Progress Note


Subjective


Additional Comments


lft's nml


wbc improved


hida noted and negative


patient head mainly on left lateral causing trach pressure.  small skin 

breakdown noted around trach. dressings applied





Objective





Last 24 Hour Vital Signs








  Date Time  Temp Pulse Resp B/P (MAP) Pulse Ox O2 Delivery O2 Flow Rate FiO2


 


10/2/20 08:09  106  147/83    


 


10/2/20 08:00      Mechanical Ventilator  


 


10/2/20 08:00  102      


 


10/2/20 08:00        40


 


10/2/20 08:00 98.2 100 20 147/83 (104) 100   


 


10/2/20 07:10  93 20     28


 


10/2/20 04:00        40


 


10/2/20 04:00      Mechanical Ventilator  


 


10/2/20 04:00 98.1 92 21 137/68 (91) 100   


 


10/2/20 03:28  92      


 


10/2/20 02:55  97 25     28


 


10/2/20 00:00  93      


 


10/2/20 00:00 98.4 92 20 115/61 (79) 100   


 


10/2/20 00:00      Mechanical Ventilator  


 


10/1/20 23:30  94 24     28


 


10/1/20 20:00        40


 


10/1/20 20:00      Mechanical Ventilator  


 


10/1/20 20:00 98.2 96 21 118/61 (80) 100   


 


10/1/20 19:50  104 21     28


 


10/1/20 19:43  105      


 


10/1/20 16:00        40


 


10/1/20 16:00 98.1 92 22 127/70 (89) 100   


 


10/1/20 16:00      Mechanical Ventilator  


 


10/1/20 16:00  86      


 


10/1/20 15:16  88 21     28


 


10/1/20 12:00 98.1 90 23 139/70 (93) 100   


 


10/1/20 12:00  94      


 


10/1/20 12:00      Mechanical Ventilator  


 


10/1/20 12:00        40








I&O











Intake and Output  


 


 10/1/20 10/2/20





 19:00 07:00


 


Intake Total 220 ml 1169 ml


 


Output Total  2100 ml


 


Balance 220 ml -931 ml


 


  


 


Intake Free Water 80 ml 75 ml


 


IV Total 100 ml 634 ml


 


Tube Feeding 40 ml 460 ml


 


Output Urine Total  2100 ml


 


# Bowel Movements 300 400








Dressing:  saturated


Cardiovascular:  RSR


Respiratory:  decreased breath sounds


Abdomen:  soft, non-tender, present bowel sounds


Extremities:  no edema, no tenderness, no cyanosis





Laboratory Tests








Test


 10/2/20


02:40


 


Sodium Level


 146 MMOL/L


(136-145)  H


 


Potassium Level


 3.8 MMOL/L


(3.5-5.1)


 


Chloride Level


 111 MMOL/L


()  H


 


Carbon Dioxide Level


 24 MMOL/L


(21-32)


 


Anion Gap


 11 mmol/L


(5-15)


 


Blood Urea Nitrogen


 5 mg/dL (7-18)


L


 


Creatinine


 0.4 MG/DL


(0.55-1.30)  L


 


Estimat Glomerular Filtration


Rate > 60 mL/min


(>60)


 


Glucose Level


 84 MG/DL


()


 


Calcium Level


 8.0 MG/DL


(8.5-10.1)  L


 


Magnesium Level


 1.2 MG/DL


(1.8-2.4)  L











Plan


Problems:  


(1) Laceration of scalp


Assessment & Plan:  patient noted to have right occipital scalp laceration 5cm 

1mm dehiscence.  serous oozing.  no pus at this time.


--hold on staples or closure given secondary intention and age of wound   


staples noted to frontal right scalp 


staples removed


dressings applied to wound 


will monitor


no acute surgical intervention planned 





(2) Pneumonia


(3) Rapid atrial fibrillation


(4) Sepsis


Assessment & Plan:  leukocytosis


anemia


scalp lac with pus drainage prior now clear


abd wound with staples in place


pna


micro noted


on abx


labs improving


staples removed from scalp and abdomen


wounds evaluated and care plan initiated


lft's nml


wbc improved


hida noted and negative


patient head mainly on left lateral causing trach pressure.  small skin 

breakdown noted around trach. dressings applied 


will monitor


thank you 














Valentino Coleman                 Oct 2, 2020 11:35

## 2020-10-02 NOTE — INFECTIOUS DISEASES PROG NOTE
Assessment/Plan


Assessment/Plan


antibiotics : meropenem





A


1. serratia sepsis


s.p catheter removal


2. e.coli UTI


3. shock resolved


4, leucocytosis resolved


5. respiratory failure s/p tracheostomy


6. seizures





P


1. continue meropenem 1 more day


2. will follow up cultures





Subjective


ROS Limited/Unobtainable:  Yes


Allergies:  


Coded Allergies:  


     No Known Allergies (Unverified , 9/24/20)





Objective





Last 24 Hour Vital Signs








  Date Time  Temp Pulse Resp B/P (MAP) Pulse Ox O2 Delivery O2 Flow Rate FiO2


 


10/2/20 08:09  106  147/83    


 


10/2/20 08:00      Mechanical Ventilator  


 


10/2/20 08:00  102      


 


10/2/20 08:00        40


 


10/2/20 08:00 98.2 100 20 147/83 (104) 100   


 


10/2/20 07:10  93 20     28


 


10/2/20 04:00        40


 


10/2/20 04:00      Mechanical Ventilator  


 


10/2/20 04:00 98.1 92 21 137/68 (91) 100   


 


10/2/20 03:28  92      


 


10/2/20 02:55  97 25     28


 


10/2/20 00:00  93      


 


10/2/20 00:00 98.4 92 20 115/61 (79) 100   


 


10/2/20 00:00      Mechanical Ventilator  


 


10/1/20 23:30  94 24     28


 


10/1/20 20:00        40


 


10/1/20 20:00      Mechanical Ventilator  


 


10/1/20 20:00 98.2 96 21 118/61 (80) 100   


 


10/1/20 19:50  104 21     28


 


10/1/20 19:43  105      


 


10/1/20 16:00        40


 


10/1/20 16:00 98.1 92 22 127/70 (89) 100   


 


10/1/20 16:00      Mechanical Ventilator  


 


10/1/20 16:00  86      


 


10/1/20 15:16  88 21     28


 


10/1/20 12:00 98.1 90 23 139/70 (93) 100   


 


10/1/20 12:00  94      


 


10/1/20 12:00      Mechanical Ventilator  


 


10/1/20 12:00        40








Height (Feet):  5


Height (Inches):  10.00


Weight (Pounds):  200


HEENT:  status post trach


Respiratory/Chest:  lungs clear


Cardiovascular:  normal rate, regular rhythm, no gallop/murmur


Abdomen:  soft, non tender, other - GT


Extremities:  other - + edema, right arm PICC





Laboratory Tests








Test


 10/2/20


02:40


 


Sodium Level


 146 MMOL/L


(136-145)  H


 


Potassium Level


 3.8 MMOL/L


(3.5-5.1)


 


Chloride Level


 111 MMOL/L


()  H


 


Carbon Dioxide Level


 24 MMOL/L


(21-32)


 


Anion Gap


 11 mmol/L


(5-15)


 


Blood Urea Nitrogen


 5 mg/dL (7-18)


L


 


Creatinine


 0.4 MG/DL


(0.55-1.30)  L


 


Estimat Glomerular Filtration


Rate > 60 mL/min


(>60)


 


Glucose Level


 84 MG/DL


()


 


Calcium Level


 8.0 MG/DL


(8.5-10.1)  L


 


Magnesium Level


 1.2 MG/DL


(1.8-2.4)  L











Current Medications








 Medications


  (Trade)  Dose


 Ordered  Sig/Tony


 Route


 PRN Reason  Start Time


 Stop Time Status Last Admin


Dose Admin


 


 Acetaminophen


  (Tylenol)  650 mg  Q6H  PRN


 GT


 Mild Pain (Pain Scale 1-3)  9/24/20 18:45


 10/24/20 18:44  9/24/20 21:39





 


 Acetaminophen


  (Tylenol)  650 mg  Q6H  PRN


 GT


 Temp >100.5  9/24/20 19:15


 10/24/20 19:14  9/25/20 17:13





 


 Ascorbic Acid


  (Vitamin C)  500 mg  TWICE A  DAY


 GT


   9/25/20 09:00


 10/25/20 08:59  10/2/20 08:03





 


 Bisacodyl


  (Dulcolax)  10 mg  DAILYPRN  PRN


 RECTAL


 Constipation  9/24/20 18:45


 12/23/20 18:44   





 


 Carvedilol


  (Coreg)  12.5 mg  EVERY 12  HOURS


 GT


   10/2/20 09:00


 11/1/20 08:59  10/2/20 08:09





 


 Chlorhexidine


 Gluconate


  (Holli-Hex 2%)  1 applic  DAILY@2000


 TOPIC


   9/25/20 20:00


 12/24/20 19:59  10/1/20 20:29





 


 Diphenoxylate HCl/


 Atropine


  (Lomotil)  2.5 mg  Q4H  PRN


 GT


 Diarrhea  9/28/20 07:15


 10/28/20 07:14  10/2/20 08:04





 


 Famotidine


  (Pepcid)  20 mg  TWICE A  DAY


 GT


   9/24/20 18:45


 12/23/20 18:44  10/2/20 08:04





 


 Heparin Sodium


  (Porcine)


  (Heparin 5000


 units/ml)  5,000 units  EVERY 12  HOURS


 SUBQ


   9/24/20 21:00


 11/8/20 20:59  10/2/20 08:07





 


 Iron Sucrose 100


 mg/Sodium Chloride  60 ml @ 


 240 mls/hr  BEDTIME


 IV


   9/28/20 21:00


 10/2/20 21:14  10/1/20 20:29





 


 Levetiracetam


  (Keppra)  1,000 mg  EVERY 12  HOURS


 GT


   9/24/20 21:00


 10/24/20 20:59  10/2/20 08:05





 


 Meropenem 1 gm/


 Sodium Chloride  55 ml @ 


 110 mls/hr  Q8HR


 IVPB


   9/30/20 14:00


 10/5/20 13:59  10/2/20 05:00





 


 Zinc Sulfate


  (Zinc Sulfate)  220 mg  DAILY


 GT


   9/24/20 21:00


 12/23/20 20:59  10/2/20 08:02




















Frank Bernardo MD       Oct 2, 2020 11:21

## 2020-10-03 VITALS — DIASTOLIC BLOOD PRESSURE: 74 MMHG | SYSTOLIC BLOOD PRESSURE: 142 MMHG

## 2020-10-03 VITALS — SYSTOLIC BLOOD PRESSURE: 110 MMHG | DIASTOLIC BLOOD PRESSURE: 65 MMHG

## 2020-10-03 VITALS — SYSTOLIC BLOOD PRESSURE: 142 MMHG | DIASTOLIC BLOOD PRESSURE: 78 MMHG

## 2020-10-03 VITALS — SYSTOLIC BLOOD PRESSURE: 135 MMHG | DIASTOLIC BLOOD PRESSURE: 75 MMHG

## 2020-10-03 VITALS — DIASTOLIC BLOOD PRESSURE: 68 MMHG | SYSTOLIC BLOOD PRESSURE: 128 MMHG

## 2020-10-03 VITALS — SYSTOLIC BLOOD PRESSURE: 129 MMHG | DIASTOLIC BLOOD PRESSURE: 69 MMHG

## 2020-10-03 LAB
ADD MANUAL DIFF: NO
ALBUMIN SERPL-MCNC: 1.6 G/DL (ref 3.4–5)
ALBUMIN/GLOB SERPL: 0.4 {RATIO} (ref 1–2.7)
ALP SERPL-CCNC: 76 U/L (ref 46–116)
ALT SERPL-CCNC: 17 U/L (ref 12–78)
ANION GAP SERPL CALC-SCNC: 8 MMOL/L (ref 5–15)
AST SERPL-CCNC: 37 U/L (ref 15–37)
BASOPHILS NFR BLD AUTO: 1 % (ref 0–2)
BILIRUB SERPL-MCNC: 0.5 MG/DL (ref 0.2–1)
BUN SERPL-MCNC: 5 MG/DL (ref 7–18)
CALCIUM SERPL-MCNC: 8 MG/DL (ref 8.5–10.1)
CHLORIDE SERPL-SCNC: 108 MMOL/L (ref 98–107)
CO2 SERPL-SCNC: 26 MMOL/L (ref 21–32)
CREAT SERPL-MCNC: 0.4 MG/DL (ref 0.55–1.3)
EOSINOPHIL NFR BLD AUTO: 0.2 % (ref 0–3)
ERYTHROCYTE [DISTWIDTH] IN BLOOD BY AUTOMATED COUNT: 18.5 % (ref 11.6–14.8)
GLOBULIN SER-MCNC: 4.1 G/DL
HCT VFR BLD CALC: 26.1 % (ref 42–52)
HGB BLD-MCNC: 8.2 G/DL (ref 14.2–18)
LYMPHOCYTES NFR BLD AUTO: 27.5 % (ref 20–45)
MCV RBC AUTO: 85 FL (ref 80–99)
MONOCYTES NFR BLD AUTO: 5.6 % (ref 1–10)
NEUTROPHILS NFR BLD AUTO: 65.7 % (ref 45–75)
PLATELET # BLD: 255 K/UL (ref 150–450)
POTASSIUM SERPL-SCNC: 3.6 MMOL/L (ref 3.5–5.1)
RBC # BLD AUTO: 3.08 M/UL (ref 4.7–6.1)
SODIUM SERPL-SCNC: 141 MMOL/L (ref 136–145)
WBC # BLD AUTO: 12.7 K/UL (ref 4.8–10.8)

## 2020-10-03 RX ADMIN — LEVETIRACETAM SCH MG: 100 SOLUTION ORAL at 09:21

## 2020-10-03 RX ADMIN — HEPARIN SODIUM SCH UNITS: 5000 INJECTION INTRAVENOUS; SUBCUTANEOUS at 09:23

## 2020-10-03 RX ADMIN — MEROPENEM SCH MLS/HR: 1 INJECTION INTRAVENOUS at 21:53

## 2020-10-03 RX ADMIN — MEROPENEM SCH MLS/HR: 1 INJECTION INTRAVENOUS at 14:00

## 2020-10-03 RX ADMIN — CARVEDILOL SCH MG: 12.5 TABLET, FILM COATED ORAL at 09:21

## 2020-10-03 RX ADMIN — Medication SCH MG: at 09:21

## 2020-10-03 RX ADMIN — Medication SCH MG: at 17:56

## 2020-10-03 RX ADMIN — ACETAMINOPHEN PRN MG: 160 SOLUTION ORAL at 15:47

## 2020-10-03 RX ADMIN — HEPARIN SODIUM SCH UNITS: 5000 INJECTION INTRAVENOUS; SUBCUTANEOUS at 21:57

## 2020-10-03 RX ADMIN — ZINC SULFATE CAP 220 MG (50 MG ELEMENTAL ZN) SCH MG: 220 (50 ZN) CAP at 09:21

## 2020-10-03 RX ADMIN — MEROPENEM SCH MLS/HR: 1 INJECTION INTRAVENOUS at 05:03

## 2020-10-03 RX ADMIN — CARVEDILOL SCH MG: 12.5 TABLET, FILM COATED ORAL at 21:56

## 2020-10-03 RX ADMIN — LEVETIRACETAM SCH MG: 100 SOLUTION ORAL at 21:56

## 2020-10-03 RX ADMIN — CHLORHEXIDINE GLUCONATE SCH APPLIC: 213 SOLUTION TOPICAL at 20:01

## 2020-10-03 NOTE — GENERAL PROGRESS NOTE
Subjective


Allergies:  


Coded Allergies:  


     No Known Allergies (Unverified , 9/24/20)


Subjective


bp stable. remains on the vent. awake but confused. does not track or follow 

commands. no fevers or chills. Abd us noted. HIDA ordered. on multiple iv abx.





Objective





Last 24 Hour Vital Signs








  Date Time  Temp Pulse Resp B/P (MAP) Pulse Ox O2 Delivery O2 Flow Rate FiO2


 


10/3/20 11:10  91 20     28


 


10/3/20 09:21  87  142/74    


 


10/3/20 09:00        40


 


10/3/20 08:00 99.5 87 20 142/78 (99) 100   


 


10/3/20 08:00      Mechanical Ventilator  


 


10/3/20 08:00  89      


 


10/3/20 07:05  87 20     28


 


10/3/20 04:00        40


 


10/3/20 04:00      Mechanical Ventilator  


 


10/3/20 04:00  90      


 


10/3/20 04:00 98.4 85 20 142/74 (96) 99   


 


10/3/20 03:22  85 20     28


 


10/3/20 00:00      Mechanical Ventilator  


 


10/3/20 00:00  76      


 


10/3/20 00:00 98.2 86 20 129/69 (89) 98   


 


10/2/20 23:54  88 20     28


 


10/2/20 20:12  85  125/65    


 


10/2/20 20:00        40


 


10/2/20 20:00      Mechanical Ventilator  


 


10/2/20 20:00  94      


 


10/2/20 20:00 96.8 96 20 119/74 (89) 100   


 


10/2/20 19:48  88 20     28


 


10/2/20 16:00 98.8 99 23 120/65 (83) 100   


 


10/2/20 16:00        40


 


10/2/20 16:00      Mechanical Ventilator  


 


10/2/20 16:00  80      


 


10/2/20 14:50  95 25     28

















Intake and Output  


 


 10/2/20 10/3/20





 19:00 07:00


 


Intake Total 720 ml 865 ml


 


Output Total 4950 ml 550 ml


 


Balance -4230 ml 315 ml


 


  


 


Intake Free Water 200 ml 50 ml


 


IV Total  595 ml


 


Tube Feeding 280 ml 220 ml


 


Other 240 ml 


 


Output Urine Total 4900 ml 550 ml


 


Stool Total 50 ml 


 


# Bowel Movements 300 110








Height (Feet):  5


Height (Inches):  10.00


Weight (Pounds):  200


Objective


General Appearance:  WD/WN, lethargic, confused


EENT:  normal ENT inspection


Neck:  non-tender, normal alignment


Cardiovascular:  normal peripheral pulses, normal rate, regular rhythm


Respiratory/Chest:  chest wall non-tender, lungs clear, normal breath sounds


Abdomen:  normal bowel sounds, non tender, soft, no organomegaly


Edema: generalized edema


Neurologic:  disoriented, unresponsive





Assessment/Plan


Problem List:  


(1) Pneumonia


ICD Codes:  J18.9 - Pneumonia, unspecified organism


SNOMED:  738752282


(2) Rapid atrial fibrillation


ICD Codes:  I48.91 - Unspecified atrial fibrillation


SNOMED:  861838099


(3) Sepsis


ICD Codes:  A41.9 - Sepsis, unspecified organism


SNOMED:  80301745


Status:  stable


Assessment/Plan:


monitor bp


wean as able


iv abx- last day of abx today


follow up cultures


transfuse as needed


monitor for bleeding


replace lytes- labs pending for today


PPI rx


replace lytes


guarded but improving


message left with sister re: status and poc











Brijesh Dewey MD               Oct 3, 2020 13:19

## 2020-10-03 NOTE — CARDIOLOGY PROGRESS NOTE
Subjective


DATE OF SERVICE:  Oct 2, 2020





Remains off pressors.


Low Mg++ again; Na+ levels increasing


HIDA scan negative





Objective





Last 24 Hour Vital Signs








  Date Time  Temp Pulse Resp B/P (MAP) Pulse Ox O2 Delivery O2 Flow Rate FiO2


 


10/3/20 00:00      Mechanical Ventilator  


 


10/3/20 00:00  76      


 


10/3/20 00:00 98.2 86 20 129/69 (89) 98   


 


10/2/20 23:54  88 20     28


 


10/2/20 20:12  85  125/65    


 


10/2/20 20:00        40


 


10/2/20 20:00      Mechanical Ventilator  


 


10/2/20 20:00  94      


 


10/2/20 20:00 96.8 96 20 119/74 (89) 100   


 


10/2/20 19:48  88 20     28


 


10/2/20 16:00 98.8 99 23 120/65 (83) 100   


 


10/2/20 16:00        40


 


10/2/20 16:00      Mechanical Ventilator  


 


10/2/20 16:00  80      


 


10/2/20 14:50  95 25     28


 


10/2/20 12:00 98.6 90 20 119/69 (86) 98   


 


10/2/20 12:00        40


 


10/2/20 12:00      Mechanical Ventilator  


 


10/2/20 12:00  94      


 


10/2/20 11:00  90 20     28


 


10/2/20 08:09  106  147/83    


 


10/2/20 08:00      Mechanical Ventilator  


 


10/2/20 08:00  102      


 


10/2/20 08:00        40


 


10/2/20 08:00 98.2 100 20 147/83 (104) 100   


 


10/2/20 07:10  93 20     28


 


10/2/20 04:00        40


 


10/2/20 04:00      Mechanical Ventilator  


 


10/2/20 04:00 98.1 92 21 137/68 (91) 100   


 


10/2/20 03:28  92      


 


10/2/20 02:55  97 25     28








ROS: unchanged from my note of 9/24/20.


HEENT:  Thin Trach secretions


RHYTHM:  Afib


LUNGS:  bilateral rhonchi


CARDIAC:  irregularly irregular


ABDOMEN:  non tender, soft, distended, G-Tube intact


EXTREMITIES:  trace edema, other - Neuro: comatose state





Laboratory Tests








Test


 10/2/20


02:40


 


Sodium Level


 146 MMOL/L


(136-145)  H


 


Potassium Level


 3.8 MMOL/L


(3.5-5.1)


 


Chloride Level


 111 MMOL/L


()  H


 


Carbon Dioxide Level


 24 MMOL/L


(21-32)


 


Anion Gap


 11 mmol/L


(5-15)


 


Blood Urea Nitrogen


 5 mg/dL (7-18)


L


 


Creatinine


 0.4 MG/DL


(0.55-1.30)  L


 


Estimat Glomerular Filtration


Rate > 60 mL/min


(>60)


 


Glucose Level


 84 MG/DL


()


 


Calcium Level


 8.0 MG/DL


(8.5-10.1)  L


 


Magnesium Level


 1.2 MG/DL


(1.8-2.4)  L











Assessment/Plan


Assessment/Plan


Sepsis with shock


Respiratory failure


Chronic vegetative state


Anemia


Hypokalemia - total body depl


Hypomagnesemia - severe


Lactic acidosis


PAFib with RVR














Abx


Resp support


Add'l free water by IV route


Hold amlodipine until BP parameters warrent resumption.  Will continue 

carvedilol now.


PRBC transfusion as needed.


Replace IV Mg++ and K+ again











Jamie Jo MD                 Oct 3, 2020 01:29

## 2020-10-03 NOTE — SURGERY PROGRESS NOTE
Surgery Progress Note


Subjective


Symptoms:  tolerating diet, passing flatus, BM





Objective





Last 24 Hour Vital Signs








  Date Time  Temp Pulse Resp B/P (MAP) Pulse Ox O2 Delivery O2 Flow Rate FiO2


 


10/3/20 07:05  87 20     28


 


10/3/20 04:00        40


 


10/3/20 04:00      Mechanical Ventilator  


 


10/3/20 04:00  90      


 


10/3/20 04:00 98.4 85 20 142/74 (96) 99   


 


10/3/20 03:22  85 20     28


 


10/3/20 00:00      Mechanical Ventilator  


 


10/3/20 00:00  76      


 


10/3/20 00:00 98.2 86 20 129/69 (89) 98   


 


10/2/20 23:54  88 20     28


 


10/2/20 20:12  85  125/65    


 


10/2/20 20:00        40


 


10/2/20 20:00      Mechanical Ventilator  


 


10/2/20 20:00  94      


 


10/2/20 20:00 96.8 96 20 119/74 (89) 100   


 


10/2/20 19:48  88 20     28


 


10/2/20 16:00 98.8 99 23 120/65 (83) 100   


 


10/2/20 16:00        40


 


10/2/20 16:00      Mechanical Ventilator  


 


10/2/20 16:00  80      


 


10/2/20 14:50  95 25     28


 


10/2/20 12:00 98.6 90 20 119/69 (86) 98   


 


10/2/20 12:00        40


 


10/2/20 12:00      Mechanical Ventilator  


 


10/2/20 12:00  94      


 


10/2/20 11:00  90 20     28








I&O











Intake and Output  


 


 10/2/20 10/3/20





 19:00 07:00


 


Intake Total 720 ml 865 ml


 


Output Total 4950 ml 550 ml


 


Balance -4230 ml 315 ml


 


  


 


Intake Free Water 200 ml 50 ml


 


IV Total  595 ml


 


Tube Feeding 280 ml 220 ml


 


Other 240 ml 


 


Output Urine Total 4900 ml 550 ml


 


Stool Total 50 ml 


 


# Bowel Movements 300 110








Dressing:  saturated


Wound:  clean, intact


Cardiovascular:  RSR


Respiratory:  decreased breath sounds


Abdomen:  non-tender, present bowel sounds


Extremities:  no edema, no tenderness, no cyanosis





Plan


Problems:  


(1) Laceration of scalp


Assessment & Plan:  patient noted to have right occipital scalp laceration 5cm 

1mm dehiscence.  serous oozing.  no pus at this time.


--hold on staples or closure given secondary intention and age of wound   


staples noted to frontal right scalp 


staples removed


dressings applied to wound 


will monitor


no acute surgical intervention planned 





(2) Pneumonia


(3) Rapid atrial fibrillation


(4) Sepsis


Assessment & Plan:  leukocytosis


anemia


scalp lac with pus drainage prior now clear


abd wound with staples in place


pna


micro noted


on abx


labs improving


staples removed from scalp and abdomen


wounds evaluated and care plan initiated


lft's nml


wbc improved


hida noted and negative


patient head mainly on left lateral causing trach pressure.  small skin 

breakdown noted around trach. dressings applied 


will monitor


thank you 














Valentino Coleman                 Oct 3, 2020 09:09

## 2020-10-04 VITALS — DIASTOLIC BLOOD PRESSURE: 81 MMHG | SYSTOLIC BLOOD PRESSURE: 146 MMHG

## 2020-10-04 VITALS — SYSTOLIC BLOOD PRESSURE: 122 MMHG | DIASTOLIC BLOOD PRESSURE: 72 MMHG

## 2020-10-04 VITALS — SYSTOLIC BLOOD PRESSURE: 140 MMHG | DIASTOLIC BLOOD PRESSURE: 82 MMHG

## 2020-10-04 LAB
ADD MANUAL DIFF: YES
ALBUMIN SERPL-MCNC: 1.4 G/DL (ref 3.4–5)
ALBUMIN/GLOB SERPL: 0.4 {RATIO} (ref 1–2.7)
ALP SERPL-CCNC: 66 U/L (ref 46–116)
ALT SERPL-CCNC: 13 U/L (ref 12–78)
ANION GAP SERPL CALC-SCNC: 6 MMOL/L (ref 5–15)
AST SERPL-CCNC: 37 U/L (ref 15–37)
BILIRUB SERPL-MCNC: 0.4 MG/DL (ref 0.2–1)
BUN SERPL-MCNC: 6 MG/DL (ref 7–18)
CALCIUM SERPL-MCNC: 7.4 MG/DL (ref 8.5–10.1)
CHLORIDE SERPL-SCNC: 105 MMOL/L (ref 98–107)
CO2 SERPL-SCNC: 25 MMOL/L (ref 21–32)
CREAT SERPL-MCNC: 0.4 MG/DL (ref 0.55–1.3)
ERYTHROCYTE [DISTWIDTH] IN BLOOD BY AUTOMATED COUNT: 17.7 % (ref 11.6–14.8)
GLOBULIN SER-MCNC: 3.8 G/DL
HCT VFR BLD CALC: 23.8 % (ref 42–52)
HGB BLD-MCNC: 7.9 G/DL (ref 14.2–18)
MCV RBC AUTO: 82 FL (ref 80–99)
PLATELET # BLD: 226 K/UL (ref 150–450)
POTASSIUM SERPL-SCNC: 3.3 MMOL/L (ref 3.5–5.1)
RBC # BLD AUTO: 2.91 M/UL (ref 4.7–6.1)
SODIUM SERPL-SCNC: 136 MMOL/L (ref 136–145)
WBC # BLD AUTO: 13 K/UL (ref 4.8–10.8)

## 2020-10-04 RX ADMIN — HEPARIN SODIUM SCH UNITS: 5000 INJECTION INTRAVENOUS; SUBCUTANEOUS at 09:00

## 2020-10-04 RX ADMIN — Medication SCH MG: at 08:57

## 2020-10-04 RX ADMIN — LEVETIRACETAM SCH MG: 100 SOLUTION ORAL at 08:57

## 2020-10-04 RX ADMIN — DIPHENOXYLATE HYDROCHLORIDE AND ATROPINE SULFATE PRN MG: 2.5; .025 TABLET ORAL at 16:16

## 2020-10-04 RX ADMIN — DIPHENOXYLATE HYDROCHLORIDE AND ATROPINE SULFATE PRN MG: 2.5; .025 TABLET ORAL at 12:00

## 2020-10-04 RX ADMIN — ZINC SULFATE CAP 220 MG (50 MG ELEMENTAL ZN) SCH MG: 220 (50 ZN) CAP at 09:00

## 2020-10-04 RX ADMIN — MEROPENEM SCH MLS/HR: 1 INJECTION INTRAVENOUS at 13:37

## 2020-10-04 RX ADMIN — MEROPENEM SCH MLS/HR: 1 INJECTION INTRAVENOUS at 05:40

## 2020-10-04 RX ADMIN — CARVEDILOL SCH MG: 12.5 TABLET, FILM COATED ORAL at 08:56

## 2020-10-04 NOTE — CARDIOLOGY PROGRESS NOTE
Subjective


DATE OF SERVICE:  Oct 4, 2020





Remains off pressors with stable BP


Lytes and hb relatively stable


HIDA scan negative; antibiotics completed.





Objective





Last 24 Hour Vital Signs








  Date Time  Temp Pulse Resp B/P (MAP) Pulse Ox O2 Delivery O2 Flow Rate FiO2


 


10/4/20 15:23  86 20     28


 


10/4/20 12:00      Mechanical Ventilator  


 


10/4/20 12:00        40


 


10/4/20 12:00  84      


 


10/4/20 10:55  89 21     28


 


10/4/20 08:56  97  146/81    


 


10/4/20 08:00  97      


 


10/4/20 08:00 99.0 88 19 146/81 (102) 100   


 


10/4/20 07:45      Mechanical Ventilator  


 


10/4/20 07:39        40


 


10/4/20 06:51  85 20     28


 


10/4/20 04:00      Mechanical Ventilator  


 


10/4/20 04:00 98.2 87 22 140/82 (101) 97   


 


10/4/20 04:00        40


 


10/4/20 04:00  77      


 


10/4/20 03:05  91 21     28


 


10/4/20 00:00        40


 


10/4/20 00:00 97.9 85 21 122/72 (89) 97   


 


10/4/20 00:00      Mechanical Ventilator  


 


10/4/20 00:00  78      


 


10/3/20 22:49  83 21     28


 


10/3/20 21:56  82  110/62    


 


10/3/20 20:16  82      


 


10/3/20 20:07      Mechanical Ventilator  


 


10/3/20 20:00 98.2 82 20 110/65 (80) 97   


 


10/3/20 20:00        40


 


10/3/20 18:47  89 22     28








ROS: unchanged from my note of 9/24/20.


HEENT:  Thin Trach secretions


RHYTHM:  Afib


LUNGS:  bilateral rhonchi


CARDIAC:  irregularly irregular


ABDOMEN:  non tender, soft, distended, G-Tube intact


EXTREMITIES:  trace edema, other - Neuro: comatose state





Laboratory Tests








Test


 10/4/20


05:00


 


White Blood Count


 13.0 K/UL


(4.8-10.8)  H


 


Red Blood Count


 2.91 M/UL


(4.70-6.10)  L


 


Hemoglobin


 7.9 G/DL


(14.2-18.0)  L


 


Hematocrit


 23.8 %


(42.0-52.0)  L


 


Mean Corpuscular Volume 82 FL (80-99)  


 


Mean Corpuscular Hemoglobin


 27.2 PG


(27.0-31.0)


 


Mean Corpuscular Hemoglobin


Concent 33.3 G/DL


(32.0-36.0)


 


Red Cell Distribution Width


 17.7 %


(11.6-14.8)  H


 


Platelet Count


 226 K/UL


(150-450)


 


Mean Platelet Volume


 6.9 FL


(6.5-10.1)


 


Neutrophils (%) (Auto)


 % (45.0-75.0)





 


Lymphocytes (%) (Auto)


 % (20.0-45.0)





 


Monocytes (%) (Auto)  % (1.0-10.0)  


 


Eosinophils (%) (Auto)  % (0.0-3.0)  


 


Basophils (%) (Auto)  % (0.0-2.0)  


 


Differential Total Cells


Counted 100  





 


Neutrophils % (Manual) 77 % (45-75)  H


 


Lymphocytes % (Manual) 16 % (20-45)  L


 


Monocytes % (Manual) 7 % (1-10)  


 


Eosinophils % (Manual) 0 % (0-3)  


 


Basophils % (Manual) 0 % (0-2)  


 


Band Neutrophils 0 % (0-8)  


 


Platelet Estimate Adequate  


 


Platelet Morphology Normal  


 


Hypochromasia 3+  


 


Anisocytosis 1+  


 


Spherocytes 1+  


 


Sodium Level


 136 MMOL/L


(136-145)


 


Potassium Level


 3.3 MMOL/L


(3.5-5.1)  L


 


Chloride Level


 105 MMOL/L


()


 


Carbon Dioxide Level


 25 MMOL/L


(21-32)


 


Anion Gap


 6 mmol/L


(5-15)


 


Blood Urea Nitrogen


 6 mg/dL (7-18)


L


 


Creatinine


 0.4 MG/DL


(0.55-1.30)  L


 


Estimat Glomerular Filtration


Rate > 60 mL/min


(>60)


 


Glucose Level


 80 MG/DL


()


 


Calcium Level


 7.4 MG/DL


(8.5-10.1)  L


 


Total Bilirubin


 0.4 MG/DL


(0.2-1.0)


 


Aspartate Amino Transf


(AST/SGOT) 37 U/L (15-37)





 


Alanine Aminotransferase


(ALT/SGPT) 13 U/L (12-78)





 


Alkaline Phosphatase


 66 U/L


()


 


Total Protein


 5.2 G/DL


(6.4-8.2)  L


 


Albumin


 1.4 G/DL


(3.4-5.0)  L


 


Globulin 3.8 g/dL  


 


Albumin/Globulin Ratio


 0.4 (1.0-2.7)


L











Assessment/Plan


Assessment/Plan


Sepsis with shock recovered


Respiratory failure


Chronic vegetative state


Anemia


Hypokalemia - total body depl


Hypomagnesemia - severe


Lactic acidosis


PAFib with RVR


Dehydration/hypernatremia - improved

















Resp support


Off IVF


Continue carvedilol; can resume amlodipine at SNF for BP elevation.


Stable for subacute from cardiovascular standpoint.


DC meds reviewed.











Jamie Jo MD                 Oct 4, 2020 18:16

## 2020-10-04 NOTE — INFECTIOUS DISEASES PROG NOTE
Assessment/Plan


Assessment/Plan


A;


1. Serratia sepsis,treated


2. E. coli urinary tract infection.treated


3. Seizures.


4. Respiratory failure, status post tracheostomy.


5. leukocytosis.


6. Septic shock.


7. Anemia


8. Diarrhea


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








PLAN:


1. Discontinue meropenem.


2. HIDA scan: negative





Subjective


ROS Limited/Unobtainable:  Yes


Gastrointestinal/Abdominal:  Reports: diarrhea


Allergies:  


Coded Allergies:  


     No Known Allergies (Unverified , 9/24/20)





Objective





Last 24 Hour Vital Signs








  Date Time  Temp Pulse Resp B/P (MAP) Pulse Ox O2 Delivery O2 Flow Rate FiO2


 


10/4/20 12:00      Mechanical Ventilator  


 


10/4/20 12:00        40


 


10/4/20 12:00  84      


 


10/4/20 10:55  89 21     28


 


10/4/20 08:56  97  146/81    


 


10/4/20 08:00  97      


 


10/4/20 08:00 99.0 88 19 146/81 (102) 100   


 


10/4/20 07:45      Mechanical Ventilator  


 


10/4/20 07:39        40


 


10/4/20 06:51  85 20     28


 


10/4/20 04:00      Mechanical Ventilator  


 


10/4/20 04:00 98.2 87 22 140/82 (101) 97   


 


10/4/20 04:00        40


 


10/4/20 04:00  77      


 


10/4/20 03:05  91 21     28


 


10/4/20 00:00        40


 


10/4/20 00:00 97.9 85 21 122/72 (89) 97   


 


10/4/20 00:00      Mechanical Ventilator  


 


10/4/20 00:00  78      


 


10/3/20 22:49  83 21     28


 


10/3/20 21:56  82  110/62    


 


10/3/20 20:16  82      


 


10/3/20 20:07      Mechanical Ventilator  


 


10/3/20 20:00 98.2 82 20 110/65 (80) 97   


 


10/3/20 20:00        40


 


10/3/20 18:47  89 22     28


 


10/3/20 17:29 97.7       


 


10/3/20 16:00 100.8 93 20 128/68 (88) 100   


 


10/3/20 16:00  81      


 


10/3/20 15:38      Mechanical Ventilator  


 


10/3/20 15:00  94 20     28








Height (Feet):  5


Height (Inches):  10.00


Weight (Pounds):  200


HEENT:  status post trach


Respiratory/Chest:  lungs clear


Cardiovascular:  normal rate


Abdomen:  soft, non tender, other - GT & rectal tube


Extremities:  other - severe edema of arms


Neurologic/Psychiatric:  aphasia





Laboratory Tests








Test


 10/3/20


15:50 10/4/20


05:00


 


White Blood Count


 12.7 K/UL


(4.8-10.8)  H 13.0 K/UL


(4.8-10.8)  H


 


Red Blood Count


 3.08 M/UL


(4.70-6.10)  L 2.91 M/UL


(4.70-6.10)  L


 


Hemoglobin


 8.2 G/DL


(14.2-18.0)  L 7.9 G/DL


(14.2-18.0)  L


 


Hematocrit


 26.1 %


(42.0-52.0)  L 23.8 %


(42.0-52.0)  L


 


Mean Corpuscular Volume 85 FL (80-99)   82 FL (80-99)  


 


Mean Corpuscular Hemoglobin


 26.6 PG


(27.0-31.0)  L 27.2 PG


(27.0-31.0)


 


Mean Corpuscular Hemoglobin


Concent 31.5 G/DL


(32.0-36.0)  L 33.3 G/DL


(32.0-36.0)


 


Red Cell Distribution Width


 18.5 %


(11.6-14.8)  H 17.7 %


(11.6-14.8)  H


 


Platelet Count


 255 K/UL


(150-450) 226 K/UL


(150-450)


 


Mean Platelet Volume


 7.6 FL


(6.5-10.1) 6.9 FL


(6.5-10.1)


 


Neutrophils (%) (Auto)


 65.7 %


(45.0-75.0) % (45.0-75.0)





 


Lymphocytes (%) (Auto)


 27.5 %


(20.0-45.0) % (20.0-45.0)





 


Monocytes (%) (Auto)


 5.6 %


(1.0-10.0)  % (1.0-10.0)  





 


Eosinophils (%) (Auto)


 0.2 %


(0.0-3.0)  % (0.0-3.0)  





 


Basophils (%) (Auto)


 1.0 %


(0.0-2.0)  % (0.0-2.0)  





 


Sodium Level


 141 MMOL/L


(136-145) 136 MMOL/L


(136-145)


 


Potassium Level


 3.6 MMOL/L


(3.5-5.1) 3.3 MMOL/L


(3.5-5.1)  L


 


Chloride Level


 108 MMOL/L


()  H 105 MMOL/L


()


 


Carbon Dioxide Level


 26 MMOL/L


(21-32) 25 MMOL/L


(21-32)


 


Anion Gap


 8 mmol/L


(5-15) 6 mmol/L


(5-15)


 


Blood Urea Nitrogen


 5 mg/dL (7-18)


L 6 mg/dL (7-18)


L


 


Creatinine


 0.4 MG/DL


(0.55-1.30)  L 0.4 MG/DL


(0.55-1.30)  L


 


Estimat Glomerular Filtration


Rate > 60 mL/min


(>60) > 60 mL/min


(>60)


 


Glucose Level


 107 MG/DL


()  H 80 MG/DL


()


 


Calcium Level


 8.0 MG/DL


(8.5-10.1)  L 7.4 MG/DL


(8.5-10.1)  L


 


Total Bilirubin


 0.5 MG/DL


(0.2-1.0) 0.4 MG/DL


(0.2-1.0)


 


Aspartate Amino Transf


(AST/SGOT) 37 U/L (15-37)


 37 U/L (15-37)





 


Alanine Aminotransferase


(ALT/SGPT) 17 U/L (12-78)


 13 U/L (12-78)





 


Alkaline Phosphatase


 76 U/L


() 66 U/L


()


 


Total Protein


 5.7 G/DL


(6.4-8.2)  L 5.2 G/DL


(6.4-8.2)  L


 


Albumin


 1.6 G/DL


(3.4-5.0)  L 1.4 G/DL


(3.4-5.0)  L


 


Globulin 4.1 g/dL   3.8 g/dL  


 


Albumin/Globulin Ratio


 0.4 (1.0-2.7)


L 0.4 (1.0-2.7)


L


 


Differential Total Cells


Counted 


 100  





 


Neutrophils % (Manual)  77 % (45-75)  H


 


Lymphocytes % (Manual)  16 % (20-45)  L


 


Monocytes % (Manual)  7 % (1-10)  


 


Eosinophils % (Manual)  0 % (0-3)  


 


Basophils % (Manual)  0 % (0-2)  


 


Band Neutrophils  0 % (0-8)  


 


Platelet Estimate  Adequate  


 


Platelet Morphology  Normal  


 


Hypochromasia  3+  


 


Anisocytosis  1+  


 


Spherocytes  1+  











Current Medications








 Medications


  (Trade)  Dose


 Ordered  Sig/Tony


 Route


 PRN Reason  Start Time


 Stop Time Status Last Admin


Dose Admin


 


 Acetaminophen


  (Tylenol)  650 mg  Q6H  PRN


 GT


 Mild Pain (Pain Scale 1-3)  9/24/20 18:45


 10/24/20 18:44  10/3/20 15:47





 


 Acetaminophen


  (Tylenol)  650 mg  Q6H  PRN


 GT


 Temp >100.5  9/24/20 19:15


 10/24/20 19:14  9/25/20 17:13





 


 Ascorbic Acid


  (Vitamin C)  500 mg  TWICE A  DAY


 GT


   9/25/20 09:00


 10/25/20 08:59  10/4/20 08:57





 


 Bisacodyl


  (Dulcolax)  10 mg  DAILYPRN  PRN


 RECTAL


 Constipation  9/24/20 18:45


 12/23/20 18:44   





 


 Carvedilol


  (Coreg)  12.5 mg  EVERY 12  HOURS


 GT


   10/2/20 09:00


 11/1/20 08:59  10/4/20 08:56





 


 Chlorhexidine


 Gluconate


  (Holli-Hex 2%)  1 applic  DAILY@2000


 TOPIC


   9/25/20 20:00


 12/24/20 19:59  10/3/20 20:01





 


 Diphenoxylate HCl/


 Atropine


  (Lomotil)  2.5 mg  Q4H  PRN


 GT


 Diarrhea  9/28/20 07:15


 10/28/20 07:14  10/4/20 12:00





 


 Famotidine


  (Pepcid)  20 mg  TWICE A  DAY


 GT


   9/24/20 18:45


 12/23/20 18:44  10/4/20 08:57





 


 Heparin Sodium


  (Porcine)


  (Heparin 5000


 units/ml)  5,000 units  EVERY 12  HOURS


 SUBQ


   9/24/20 21:00


 11/8/20 20:59  10/4/20 09:00





 


 Levetiracetam


  (Keppra)  1,000 mg  EVERY 12  HOURS


 GT


   9/24/20 21:00


 10/24/20 20:59  10/4/20 08:57





 


 Meropenem 1 gm/


 Sodium Chloride  55 ml @ 


 110 mls/hr  Q8HR


 IVPB


   9/30/20 14:00


 10/5/20 13:59  10/4/20 13:37





 


 Sodium  1,000 ml @ 


 75 mls/hr  [id number]


 IV


   10/4/20 02:00


 11/3/20 01:59  10/4/20 03:46





 


 Zinc Sulfate


  (Zinc Sulfate)  220 mg  DAILY


 GT


   9/24/20 21:00


 12/23/20 20:59  10/4/20 09:00




















Harry Snow MD                Oct 4, 2020 15:01

## 2020-10-04 NOTE — SURGERY PROGRESS NOTE
Surgery Progress Note


Subjective


Additional Comments


leukocytosis


on support


no n/v


non responsive





Objective





Last 24 Hour Vital Signs








  Date Time  Temp Pulse Resp B/P (MAP) Pulse Ox O2 Delivery O2 Flow Rate FiO2


 


10/4/20 15:23  86 20     28


 


10/4/20 12:00      Mechanical Ventilator  


 


10/4/20 12:00        40


 


10/4/20 12:00  84      


 


10/4/20 10:55  89 21     28


 


10/4/20 08:56  97  146/81    


 


10/4/20 08:00  97      


 


10/4/20 08:00 99.0 88 19 146/81 (102) 100   


 


10/4/20 07:45      Mechanical Ventilator  


 


10/4/20 07:39        40


 


10/4/20 06:51  85 20     28


 


10/4/20 04:00      Mechanical Ventilator  


 


10/4/20 04:00 98.2 87 22 140/82 (101) 97   


 


10/4/20 04:00        40


 


10/4/20 04:00  77      


 


10/4/20 03:05  91 21     28


 


10/4/20 00:00        40


 


10/4/20 00:00 97.9 85 21 122/72 (89) 97   


 


10/4/20 00:00      Mechanical Ventilator  


 


10/4/20 00:00  78      


 


10/3/20 22:49  83 21     28


 


10/3/20 21:56  82  110/62    


 


10/3/20 20:16  82      


 


10/3/20 20:07      Mechanical Ventilator  


 


10/3/20 20:00 98.2 82 20 110/65 (80) 97   


 


10/3/20 20:00        40


 


10/3/20 18:47  89 22     28


 


10/3/20 17:29 97.7       








I&O











Intake and Output  


 


 10/3/20 10/4/20





 19:00 07:00


 


Intake Total 95 ml 1025 ml


 


Output Total  960 ml


 


Balance 95 ml 65 ml


 


  


 


IV Total 75 ml 805 ml


 


Tube Feeding 20 ml 220 ml


 


Output Urine Total  900 ml


 


Stool Total  60 ml


 


# Bowel Movements 300 








Dressing:  saturated


Wound:  intact


Cardiovascular:  RSR


Respiratory:  decreased breath sounds


Abdomen:  non-tender, present bowel sounds


Extremities:  no tenderness, no cyanosis





Laboratory Tests








Test


 10/4/20


05:00


 


White Blood Count


 13.0 K/UL


(4.8-10.8)  H


 


Red Blood Count


 2.91 M/UL


(4.70-6.10)  L


 


Hemoglobin


 7.9 G/DL


(14.2-18.0)  L


 


Hematocrit


 23.8 %


(42.0-52.0)  L


 


Mean Corpuscular Volume 82 FL (80-99)  


 


Mean Corpuscular Hemoglobin


 27.2 PG


(27.0-31.0)


 


Mean Corpuscular Hemoglobin


Concent 33.3 G/DL


(32.0-36.0)


 


Red Cell Distribution Width


 17.7 %


(11.6-14.8)  H


 


Platelet Count


 226 K/UL


(150-450)


 


Mean Platelet Volume


 6.9 FL


(6.5-10.1)


 


Neutrophils (%) (Auto)


 % (45.0-75.0)





 


Lymphocytes (%) (Auto)


 % (20.0-45.0)





 


Monocytes (%) (Auto)  % (1.0-10.0)  


 


Eosinophils (%) (Auto)  % (0.0-3.0)  


 


Basophils (%) (Auto)  % (0.0-2.0)  


 


Differential Total Cells


Counted 100  





 


Neutrophils % (Manual) 77 % (45-75)  H


 


Lymphocytes % (Manual) 16 % (20-45)  L


 


Monocytes % (Manual) 7 % (1-10)  


 


Eosinophils % (Manual) 0 % (0-3)  


 


Basophils % (Manual) 0 % (0-2)  


 


Band Neutrophils 0 % (0-8)  


 


Platelet Estimate Adequate  


 


Platelet Morphology Normal  


 


Hypochromasia 3+  


 


Anisocytosis 1+  


 


Spherocytes 1+  


 


Sodium Level


 136 MMOL/L


(136-145)


 


Potassium Level


 3.3 MMOL/L


(3.5-5.1)  L


 


Chloride Level


 105 MMOL/L


()


 


Carbon Dioxide Level


 25 MMOL/L


(21-32)


 


Anion Gap


 6 mmol/L


(5-15)


 


Blood Urea Nitrogen


 6 mg/dL (7-18)


L


 


Creatinine


 0.4 MG/DL


(0.55-1.30)  L


 


Estimat Glomerular Filtration


Rate > 60 mL/min


(>60)


 


Glucose Level


 80 MG/DL


()


 


Calcium Level


 7.4 MG/DL


(8.5-10.1)  L


 


Total Bilirubin


 0.4 MG/DL


(0.2-1.0)


 


Aspartate Amino Transf


(AST/SGOT) 37 U/L (15-37)





 


Alanine Aminotransferase


(ALT/SGPT) 13 U/L (12-78)





 


Alkaline Phosphatase


 66 U/L


()


 


Total Protein


 5.2 G/DL


(6.4-8.2)  L


 


Albumin


 1.4 G/DL


(3.4-5.0)  L


 


Globulin 3.8 g/dL  


 


Albumin/Globulin Ratio


 0.4 (1.0-2.7)


L











Plan


Problems:  


(1) Laceration of scalp


Assessment & Plan:  patient noted to have right occipital scalp laceration 5cm 

1mm dehiscence.  serous oozing.  no pus at this time.


--hold on staples or closure given secondary intention and age of wound   


staples noted to frontal right scalp 


staples removed


dressings applied to wound 


will monitor


no acute surgical intervention planned 





(2) Pneumonia


(3) Rapid atrial fibrillation


(4) Sepsis


Assessment & Plan:  leukocytosis


anemia


scalp lac with pus drainage prior now clear


abd wound with staples in place


pna


micro noted


on abx


labs improving


staples removed from scalp and abdomen


wounds evaluated and care plan initiated


lft's nml


wbc improved


hida noted and negative


patient head mainly on left lateral causing trach pressure.  small skin 

breakdown noted around trach. dressings applied 


will monitor


thank you 














Valentino Coleman                 Oct 4, 2020 16:05

## 2020-10-04 NOTE — CARDIOLOGY PROGRESS NOTE
Subjective


DATE OF SERVICE:  Oct 3, 2020





Remains off pressors.


Low Mg++ yesterday; Na+ levels now normalized.


HIDA scan negative





Objective





Last 24 Hour Vital Signs








  Date Time  Temp Pulse Resp B/P (MAP) Pulse Ox O2 Delivery O2 Flow Rate FiO2


 


10/3/20 22:49  83 21     28


 


10/3/20 21:56  82  110/62    


 


10/3/20 20:16  82      


 


10/3/20 20:07      Mechanical Ventilator  


 


10/3/20 20:00 98.2 82 20 110/65 (80) 97   


 


10/3/20 20:00        40


 


10/3/20 18:47  89 22     28


 


10/3/20 17:29 97.7       


 


10/3/20 16:00 100.8 93 20 128/68 (88) 100   


 


10/3/20 16:00  81      


 


10/3/20 15:38      Mechanical Ventilator  


 


10/3/20 15:00  94 20     28


 


10/3/20 12:00 98.4 92 20 135/75 (95) 100   


 


10/3/20 12:00        40


 


10/3/20 12:00  90      


 


10/3/20 12:00      Mechanical Ventilator  


 


10/3/20 11:10  91 20     28


 


10/3/20 09:21  87  142/74    


 


10/3/20 08:00 99.5 87 20 142/78 (99) 100   


 


10/3/20 08:00        40


 


10/3/20 08:00      Mechanical Ventilator  


 


10/3/20 08:00  89      


 


10/3/20 07:05  87 20     28


 


10/3/20 04:00        40


 


10/3/20 04:00      Mechanical Ventilator  


 


10/3/20 04:00  90      


 


10/3/20 04:00 98.4 85 20 142/74 (96) 99   


 


10/3/20 03:22  85 20     28








ROS: unchanged from my note of 9/24/20.


HEENT:  Thin Trach secretions


RHYTHM:  Afib


LUNGS:  bilateral rhonchi


CARDIAC:  irregularly irregular


ABDOMEN:  non tender, soft, distended, G-Tube intact


EXTREMITIES:  trace edema, other - Neuro: comatose state





Laboratory Tests








Test


 10/3/20


15:50


 


White Blood Count


 12.7 K/UL


(4.8-10.8)  H


 


Red Blood Count


 3.08 M/UL


(4.70-6.10)  L


 


Hemoglobin


 8.2 G/DL


(14.2-18.0)  L


 


Hematocrit


 26.1 %


(42.0-52.0)  L


 


Mean Corpuscular Volume 85 FL (80-99)  


 


Mean Corpuscular Hemoglobin


 26.6 PG


(27.0-31.0)  L


 


Mean Corpuscular Hemoglobin


Concent 31.5 G/DL


(32.0-36.0)  L


 


Red Cell Distribution Width


 18.5 %


(11.6-14.8)  H


 


Platelet Count


 255 K/UL


(150-450)


 


Mean Platelet Volume


 7.6 FL


(6.5-10.1)


 


Neutrophils (%) (Auto)


 65.7 %


(45.0-75.0)


 


Lymphocytes (%) (Auto)


 27.5 %


(20.0-45.0)


 


Monocytes (%) (Auto)


 5.6 %


(1.0-10.0)


 


Eosinophils (%) (Auto)


 0.2 %


(0.0-3.0)


 


Basophils (%) (Auto)


 1.0 %


(0.0-2.0)


 


Sodium Level


 141 MMOL/L


(136-145)


 


Potassium Level


 3.6 MMOL/L


(3.5-5.1)


 


Chloride Level


 108 MMOL/L


()  H


 


Carbon Dioxide Level


 26 MMOL/L


(21-32)


 


Anion Gap


 8 mmol/L


(5-15)


 


Blood Urea Nitrogen


 5 mg/dL (7-18)


L


 


Creatinine


 0.4 MG/DL


(0.55-1.30)  L


 


Estimat Glomerular Filtration


Rate > 60 mL/min


(>60)


 


Glucose Level


 107 MG/DL


()  H


 


Calcium Level


 8.0 MG/DL


(8.5-10.1)  L


 


Total Bilirubin


 0.5 MG/DL


(0.2-1.0)


 


Aspartate Amino Transf


(AST/SGOT) 37 U/L (15-37)





 


Alanine Aminotransferase


(ALT/SGPT) 17 U/L (12-78)





 


Alkaline Phosphatase


 76 U/L


()


 


Total Protein


 5.7 G/DL


(6.4-8.2)  L


 


Albumin


 1.6 G/DL


(3.4-5.0)  L


 


Globulin 4.1 g/dL  


 


Albumin/Globulin Ratio


 0.4 (1.0-2.7)


L











Assessment/Plan


Assessment/Plan


Sepsis with shock


Respiratory failure


Chronic vegetative state


Anemia


Hypokalemia - total body depl


Hypomagnesemia - severe


Lactic acidosis


PAFib with RVR


Dehydration/hypernatremia - improved














Abx


Resp support


Adjust IVF


Hold amlodipine until BP parameters warrent resumption.  Will continue 

carvedilol now.


PRBC transfusion as needed.


Replace IV Mg++ and K+ again











Jamie Jo MD                 Oct 4, 2020 01:09

## 2020-10-05 NOTE — DISCHARGE SUMMARY
Discharge Summary


Discharge Summary


_


DATE OF ADMISSION: 9/24/2020





DATE OF DISCHARGE: 10/4/2020








DISCHARGED BY: Dr. Dewey





REASON FOR ADMISSION: 


60 years old male with past medical history of intracranial bleeding few weeks 

ago , status post craniotomy and  shunt placement,  seizure disorder ,chronic 

respiratory failure s,tatus post tracheostomy, dysphagia, feeding by G-tube, was

transferred after hospitalization to skilled nursing facility . where  he 

developed fever and shortness of breath .


Patient  subsequently was transferred to emergency room for further evaluation 

and management.  


Upon evaluation patient was tachypneic , hypotensive , and febrile.


Chest x-ray revealed bilateral patchy infiltrates and bilateral small effusion. 




Urinalysis revealed evidence of UTI.  


Septic work-up initiated , patient pancultured  and started on empiric 

antibiotic.  


Patient   required pressors for hemodynamic support .


Patient admitted to ICU for further management .


 


CONSULTANTS:


cardiologist Dr. Jo


ID specialist Dr. Bernardo


surgery Dr. Coleman


 


[facility] COURSE: 


Patient initially admitted to ICU .


Hemodynamic status was closely monitored with goal to keep her mean arterial 

blood pressure above 65.  


Patient was able to be weaned off pressors.  


Patient was on IV hydration and empiric antibiotic.  


Blood culture revealed Serratia ,.


Urine culture revealed E. coli ESBL.


Antibiotic regimen further provided as per ID specialist recommendation.


Patient completed treatment with  antibiotic while in the hospital.  


Leukocytosis trending down, fever resolved.  


Patient noted to have diarrhea.  


Stool for C. difficile was negative.  


Patient noted to have slightly elevated AST.  


Abdominal ultrasound revealed gallbladder sludge and stones.  Hepatomegaly.  

Gallbladder wall thickening and edema raised possibility of acute cholecystitis.

  


HIDA scan subsequently was done and revealed no evidence of cystic duct or 

common bile duct obstruction.





Ventilator support and tracheostomy care provided.  


Pulmonary toilet provided as needed.  


No signs of respiratory distress on current settings.  





Hemoglobin  and hematocrit were closely monitored with goal to keep hemoglobin 

above.  


Patient undergone transfusion of  total of 2 units of packed red blood cells for

hemoglobin 6.8.  


Prior to discharge hemoglobin 7.9 , hematocrit 23.8.  


Stool for occult blood was  negative.  


Anemia work-up revealed evidence of anemia of chronic disease.  





Renal parameters and electrolytes were closely monitored,electrolytes corrected 

as needed s.


Seizure precautions maintained.  Keppra continued.  


DVT and GI prophylaxis provided.  


Surgeon seen patient for a  right occipital scalp laceration from dehiscence.


Staples were removed,  dressings were applied .


No acute surgical intervention was necessarily.  


Wound care further provided as per surgeon recommendation .


Continue wound care at the facility.





Clinically stabilized and was ready for transfer back to subacute skilled 

nursing

















FINAL DIAGNOSES: 


Sepsis with shock 


Serratia  bacteremia 


E. coli UTI 


Pneumonia 


Rapid atrial fibrillation


Chronic respiratory failure , ventilator dependent with tracheostomy status


Leukocytosis-improving 


Anemia ( requiring blood transfusion) 


Diarrhea , stool C. difficile negative


Seizure disorder


Chronic vegetative state


Lactic acidosis


Paroxysmal atrial fibrillation with rapid ventricular response


Dehydration


Electrolyte imbalance: hypernatremia, hypokalemia, hypomagnerasim  





DISCHARGE MEDICATIONS:


See Medication Reconciliation list.





DISCHARGE INSTRUCTIONS:


Patient was discharged to the skilled nursing facility. 


Follow up with medical doctor at the facility.





I have been assigned to dictate discharge summary for this account. 


I was not involved in the patient's management.











Eli Turner NP                 Oct 5, 2020 14:17